# Patient Record
Sex: MALE | Race: WHITE | HISPANIC OR LATINO | Employment: FULL TIME | URBAN - METROPOLITAN AREA
[De-identification: names, ages, dates, MRNs, and addresses within clinical notes are randomized per-mention and may not be internally consistent; named-entity substitution may affect disease eponyms.]

---

## 2018-08-27 ENCOUNTER — HOSPITAL ENCOUNTER (INPATIENT)
Facility: MEDICAL CENTER | Age: 28
LOS: 1 days | DRG: 563 | End: 2018-08-28
Attending: EMERGENCY MEDICINE | Admitting: INTERNAL MEDICINE
Payer: COMMERCIAL

## 2018-08-27 ENCOUNTER — APPOINTMENT (OUTPATIENT)
Dept: RADIOLOGY | Facility: MEDICAL CENTER | Age: 28
DRG: 563 | End: 2018-08-27
Attending: EMERGENCY MEDICINE
Payer: COMMERCIAL

## 2018-08-27 DIAGNOSIS — M65.9 FLEXOR TENOSYNOVITIS OF FINGER: ICD-10-CM

## 2018-08-27 LAB
ALBUMIN SERPL BCP-MCNC: 4.3 G/DL (ref 3.2–4.9)
ALBUMIN/GLOB SERPL: 1.3 G/DL
ALP SERPL-CCNC: 119 U/L (ref 30–99)
ALT SERPL-CCNC: 15 U/L (ref 2–50)
ANION GAP SERPL CALC-SCNC: 9 MMOL/L (ref 0–11.9)
AST SERPL-CCNC: 18 U/L (ref 12–45)
BASOPHILS # BLD AUTO: 0.4 % (ref 0–1.8)
BASOPHILS # BLD: 0.04 K/UL (ref 0–0.12)
BILIRUB SERPL-MCNC: 0.5 MG/DL (ref 0.1–1.5)
BUN SERPL-MCNC: 16 MG/DL (ref 8–22)
CALCIUM SERPL-MCNC: 9.4 MG/DL (ref 8.5–10.5)
CHLORIDE SERPL-SCNC: 105 MMOL/L (ref 96–112)
CO2 SERPL-SCNC: 23 MMOL/L (ref 20–33)
CREAT SERPL-MCNC: 0.79 MG/DL (ref 0.5–1.4)
CRP SERPL HS-MCNC: 1.47 MG/DL (ref 0–0.75)
EOSINOPHIL # BLD AUTO: 0.15 K/UL (ref 0–0.51)
EOSINOPHIL NFR BLD: 1.4 % (ref 0–6.9)
ERYTHROCYTE [DISTWIDTH] IN BLOOD BY AUTOMATED COUNT: 41.4 FL (ref 35.9–50)
ERYTHROCYTE [SEDIMENTATION RATE] IN BLOOD BY WESTERGREN METHOD: 31 MM/HOUR (ref 0–15)
GLOBULIN SER CALC-MCNC: 3.3 G/DL (ref 1.9–3.5)
GLUCOSE SERPL-MCNC: 87 MG/DL (ref 65–99)
HCT VFR BLD AUTO: 40.1 % (ref 42–52)
HGB BLD-MCNC: 12.8 G/DL (ref 14–18)
IMM GRANULOCYTES # BLD AUTO: 0.05 K/UL (ref 0–0.11)
IMM GRANULOCYTES NFR BLD AUTO: 0.5 % (ref 0–0.9)
LYMPHOCYTES # BLD AUTO: 3.19 K/UL (ref 1–4.8)
LYMPHOCYTES NFR BLD: 30.2 % (ref 22–41)
MCH RBC QN AUTO: 26.8 PG (ref 27–33)
MCHC RBC AUTO-ENTMCNC: 31.9 G/DL (ref 33.7–35.3)
MCV RBC AUTO: 84.1 FL (ref 81.4–97.8)
MONOCYTES # BLD AUTO: 0.93 K/UL (ref 0–0.85)
MONOCYTES NFR BLD AUTO: 8.8 % (ref 0–13.4)
NEUTROPHILS # BLD AUTO: 6.22 K/UL (ref 1.82–7.42)
NEUTROPHILS NFR BLD: 58.7 % (ref 44–72)
NRBC # BLD AUTO: 0 K/UL
NRBC BLD-RTO: 0 /100 WBC
PLATELET # BLD AUTO: 191 K/UL (ref 164–446)
PMV BLD AUTO: 12 FL (ref 9–12.9)
POTASSIUM SERPL-SCNC: 3.8 MMOL/L (ref 3.6–5.5)
PROT SERPL-MCNC: 7.6 G/DL (ref 6–8.2)
RBC # BLD AUTO: 4.77 M/UL (ref 4.7–6.1)
SODIUM SERPL-SCNC: 137 MMOL/L (ref 135–145)
WBC # BLD AUTO: 10.6 K/UL (ref 4.8–10.8)

## 2018-08-27 PROCEDURE — 700111 HCHG RX REV CODE 636 W/ 250 OVERRIDE (IP): Performed by: EMERGENCY MEDICINE

## 2018-08-27 PROCEDURE — 86140 C-REACTIVE PROTEIN: CPT

## 2018-08-27 PROCEDURE — 700105 HCHG RX REV CODE 258: Performed by: EMERGENCY MEDICINE

## 2018-08-27 PROCEDURE — 700102 HCHG RX REV CODE 250 W/ 637 OVERRIDE(OP): Performed by: EMERGENCY MEDICINE

## 2018-08-27 PROCEDURE — 96365 THER/PROPH/DIAG IV INF INIT: CPT

## 2018-08-27 PROCEDURE — 80053 COMPREHEN METABOLIC PANEL: CPT

## 2018-08-27 PROCEDURE — 85025 COMPLETE CBC W/AUTO DIFF WBC: CPT

## 2018-08-27 PROCEDURE — 99285 EMERGENCY DEPT VISIT HI MDM: CPT

## 2018-08-27 PROCEDURE — 85652 RBC SED RATE AUTOMATED: CPT

## 2018-08-27 PROCEDURE — A9270 NON-COVERED ITEM OR SERVICE: HCPCS | Performed by: EMERGENCY MEDICINE

## 2018-08-27 PROCEDURE — 73140 X-RAY EXAM OF FINGER(S): CPT | Mod: RT

## 2018-08-27 RX ORDER — HYDROCODONE BITARTRATE AND ACETAMINOPHEN 5; 325 MG/1; MG/1
1 TABLET ORAL ONCE
Status: COMPLETED | OUTPATIENT
Start: 2018-08-27 | End: 2018-08-27

## 2018-08-27 RX ADMIN — PIPERACILLIN AND TAZOBACTAM 4.5 G: 4; .5 INJECTION, POWDER, LYOPHILIZED, FOR SOLUTION INTRAVENOUS; PARENTERAL at 22:14

## 2018-08-27 RX ADMIN — HYDROCODONE BITARTRATE AND ACETAMINOPHEN 1 TABLET: 5; 325 TABLET ORAL at 21:08

## 2018-08-27 ASSESSMENT — PAIN SCALES - GENERAL: PAINLEVEL_OUTOF10: 10

## 2018-08-27 ASSESSMENT — LIFESTYLE VARIABLES: DO YOU DRINK ALCOHOL: YES

## 2018-08-28 ENCOUNTER — HOSPITAL ENCOUNTER (INPATIENT)
Facility: MEDICAL CENTER | Age: 28
LOS: 4 days | DRG: 514 | End: 2018-09-02
Attending: EMERGENCY MEDICINE | Admitting: INTERNAL MEDICINE
Payer: COMMERCIAL

## 2018-08-28 VITALS
RESPIRATION RATE: 16 BRPM | TEMPERATURE: 98.4 F | SYSTOLIC BLOOD PRESSURE: 130 MMHG | HEIGHT: 68 IN | OXYGEN SATURATION: 99 % | HEART RATE: 82 BPM | BODY MASS INDEX: 25.66 KG/M2 | WEIGHT: 169.31 LBS | DIASTOLIC BLOOD PRESSURE: 73 MMHG

## 2018-08-28 DIAGNOSIS — M65.9 FLEXOR TENOSYNOVITIS OF FINGER: ICD-10-CM

## 2018-08-28 PROBLEM — Z72.0 TOBACCO ABUSE: Status: ACTIVE | Noted: 2018-08-28

## 2018-08-28 PROCEDURE — 99238 HOSP IP/OBS DSCHRG MGMT 30/<: CPT | Performed by: HOSPITALIST

## 2018-08-28 PROCEDURE — 96375 TX/PRO/DX INJ NEW DRUG ADDON: CPT

## 2018-08-28 PROCEDURE — 99222 1ST HOSP IP/OBS MODERATE 55: CPT | Mod: 25 | Performed by: INTERNAL MEDICINE

## 2018-08-28 PROCEDURE — A9270 NON-COVERED ITEM OR SERVICE: HCPCS | Performed by: INTERNAL MEDICINE

## 2018-08-28 PROCEDURE — 99406 BEHAV CHNG SMOKING 3-10 MIN: CPT | Mod: 25 | Performed by: INTERNAL MEDICINE

## 2018-08-28 PROCEDURE — 96367 TX/PROPH/DG ADDL SEQ IV INF: CPT

## 2018-08-28 PROCEDURE — 770006 HCHG ROOM/CARE - MED/SURG/GYN SEMI*

## 2018-08-28 PROCEDURE — 700105 HCHG RX REV CODE 258: Performed by: INTERNAL MEDICINE

## 2018-08-28 PROCEDURE — 700102 HCHG RX REV CODE 250 W/ 637 OVERRIDE(OP): Performed by: INTERNAL MEDICINE

## 2018-08-28 PROCEDURE — 99285 EMERGENCY DEPT VISIT HI MDM: CPT

## 2018-08-28 PROCEDURE — 700111 HCHG RX REV CODE 636 W/ 250 OVERRIDE (IP): Performed by: INTERNAL MEDICINE

## 2018-08-28 RX ORDER — ONDANSETRON 2 MG/ML
4 INJECTION INTRAMUSCULAR; INTRAVENOUS EVERY 4 HOURS PRN
Status: DISCONTINUED | OUTPATIENT
Start: 2018-08-28 | End: 2018-08-28 | Stop reason: HOSPADM

## 2018-08-28 RX ORDER — ONDANSETRON 2 MG/ML
4 INJECTION INTRAMUSCULAR; INTRAVENOUS ONCE
Status: COMPLETED | OUTPATIENT
Start: 2018-08-28 | End: 2018-08-29

## 2018-08-28 RX ORDER — KETOROLAC TROMETHAMINE 30 MG/ML
30 INJECTION, SOLUTION INTRAMUSCULAR; INTRAVENOUS ONCE
Status: COMPLETED | OUTPATIENT
Start: 2018-08-28 | End: 2018-08-28

## 2018-08-28 RX ORDER — BISACODYL 10 MG
10 SUPPOSITORY, RECTAL RECTAL
Status: DISCONTINUED | OUTPATIENT
Start: 2018-08-28 | End: 2018-08-28 | Stop reason: HOSPADM

## 2018-08-28 RX ORDER — MORPHINE SULFATE 4 MG/ML
4 INJECTION, SOLUTION INTRAMUSCULAR; INTRAVENOUS ONCE
Status: COMPLETED | OUTPATIENT
Start: 2018-08-28 | End: 2018-08-29

## 2018-08-28 RX ORDER — PROMETHAZINE HYDROCHLORIDE 25 MG/1
12.5-25 TABLET ORAL EVERY 4 HOURS PRN
Status: DISCONTINUED | OUTPATIENT
Start: 2018-08-28 | End: 2018-08-28 | Stop reason: HOSPADM

## 2018-08-28 RX ORDER — OXYCODONE HYDROCHLORIDE 5 MG/1
5 TABLET ORAL
Status: DISCONTINUED | OUTPATIENT
Start: 2018-08-28 | End: 2018-08-28 | Stop reason: HOSPADM

## 2018-08-28 RX ORDER — SODIUM CHLORIDE 9 MG/ML
1000 INJECTION, SOLUTION INTRAVENOUS
Status: DISCONTINUED | OUTPATIENT
Start: 2018-08-28 | End: 2018-08-28 | Stop reason: HOSPADM

## 2018-08-28 RX ORDER — ACETAMINOPHEN 325 MG/1
650 TABLET ORAL EVERY 6 HOURS PRN
Status: DISCONTINUED | OUTPATIENT
Start: 2018-08-28 | End: 2018-08-28 | Stop reason: HOSPADM

## 2018-08-28 RX ORDER — IBUPROFEN 200 MG
400 TABLET ORAL EVERY 8 HOURS PRN
COMMUNITY

## 2018-08-28 RX ORDER — SODIUM CHLORIDE 9 MG/ML
30 INJECTION, SOLUTION INTRAVENOUS
Status: COMPLETED | OUTPATIENT
Start: 2018-08-28 | End: 2018-08-28

## 2018-08-28 RX ORDER — KETOROLAC TROMETHAMINE 30 MG/ML
30 INJECTION, SOLUTION INTRAMUSCULAR; INTRAVENOUS EVERY 6 HOURS PRN
Status: DISCONTINUED | OUTPATIENT
Start: 2018-08-28 | End: 2018-08-28 | Stop reason: HOSPADM

## 2018-08-28 RX ORDER — PROMETHAZINE HYDROCHLORIDE 12.5 MG/1
12.5-25 SUPPOSITORY RECTAL EVERY 4 HOURS PRN
Status: DISCONTINUED | OUTPATIENT
Start: 2018-08-28 | End: 2018-08-28 | Stop reason: HOSPADM

## 2018-08-28 RX ORDER — POLYETHYLENE GLYCOL 3350 17 G/17G
1 POWDER, FOR SOLUTION ORAL
Status: DISCONTINUED | OUTPATIENT
Start: 2018-08-28 | End: 2018-08-28 | Stop reason: HOSPADM

## 2018-08-28 RX ORDER — OXYCODONE HYDROCHLORIDE 10 MG/1
10 TABLET ORAL
Status: DISCONTINUED | OUTPATIENT
Start: 2018-08-28 | End: 2018-08-28 | Stop reason: HOSPADM

## 2018-08-28 RX ORDER — ONDANSETRON 4 MG/1
4 TABLET, ORALLY DISINTEGRATING ORAL EVERY 4 HOURS PRN
Status: DISCONTINUED | OUTPATIENT
Start: 2018-08-28 | End: 2018-08-28 | Stop reason: HOSPADM

## 2018-08-28 RX ORDER — SODIUM CHLORIDE 9 MG/ML
INJECTION, SOLUTION INTRAVENOUS CONTINUOUS
Status: DISCONTINUED | OUTPATIENT
Start: 2018-08-28 | End: 2018-08-28 | Stop reason: HOSPADM

## 2018-08-28 RX ORDER — AMOXICILLIN 250 MG
2 CAPSULE ORAL 2 TIMES DAILY
Status: DISCONTINUED | OUTPATIENT
Start: 2018-08-28 | End: 2018-08-28 | Stop reason: HOSPADM

## 2018-08-28 RX ORDER — MORPHINE SULFATE 4 MG/ML
4 INJECTION, SOLUTION INTRAMUSCULAR; INTRAVENOUS
Status: DISCONTINUED | OUTPATIENT
Start: 2018-08-28 | End: 2018-08-28 | Stop reason: HOSPADM

## 2018-08-28 RX ADMIN — SODIUM CHLORIDE 2301 ML: 9 INJECTION, SOLUTION INTRAVENOUS at 03:22

## 2018-08-28 RX ADMIN — AMPICILLIN AND SULBACTAM 3 G: 2; 1 INJECTION, POWDER, FOR SOLUTION INTRAVENOUS at 11:48

## 2018-08-28 RX ADMIN — SODIUM CHLORIDE: 9 INJECTION, SOLUTION INTRAVENOUS at 17:53

## 2018-08-28 RX ADMIN — KETOROLAC TROMETHAMINE 30 MG: 30 INJECTION, SOLUTION INTRAMUSCULAR at 02:01

## 2018-08-28 RX ADMIN — ACETAMINOPHEN 650 MG: 325 TABLET, FILM COATED ORAL at 18:00

## 2018-08-28 RX ADMIN — OXYCODONE HYDROCHLORIDE 10 MG: 10 TABLET ORAL at 03:21

## 2018-08-28 RX ADMIN — AMPICILLIN AND SULBACTAM 3 G: 2; 1 INJECTION, POWDER, FOR SOLUTION INTRAVENOUS at 17:53

## 2018-08-28 RX ADMIN — AMPICILLIN AND SULBACTAM 3 G: 2; 1 INJECTION, POWDER, FOR SOLUTION INTRAVENOUS at 05:41

## 2018-08-28 RX ADMIN — KETOROLAC TROMETHAMINE 30 MG: 30 INJECTION, SOLUTION INTRAMUSCULAR at 11:50

## 2018-08-28 RX ADMIN — ACETAMINOPHEN 650 MG: 325 TABLET, FILM COATED ORAL at 08:14

## 2018-08-28 RX ADMIN — SODIUM CHLORIDE: 9 INJECTION, SOLUTION INTRAVENOUS at 05:40

## 2018-08-28 RX ADMIN — MORPHINE SULFATE 4 MG: 4 INJECTION INTRAVENOUS at 05:41

## 2018-08-28 RX ADMIN — AMPICILLIN AND SULBACTAM 3 G: 2; 1 INJECTION, POWDER, FOR SOLUTION INTRAVENOUS at 02:01

## 2018-08-28 RX ADMIN — SODIUM CHLORIDE 1000 ML: 9 INJECTION, SOLUTION INTRAVENOUS at 02:01

## 2018-08-28 RX ADMIN — DOCUSATE SODIUM -SENNOSIDES 2 TABLET: 50; 8.6 TABLET, COATED ORAL at 17:53

## 2018-08-28 RX ADMIN — DOCUSATE SODIUM -SENNOSIDES 2 TABLET: 50; 8.6 TABLET, COATED ORAL at 03:21

## 2018-08-28 ASSESSMENT — LIFESTYLE VARIABLES
ALCOHOL_USE: YES
EVER_SMOKED: YES
AVERAGE NUMBER OF DAYS PER WEEK YOU HAVE A DRINK CONTAINING ALCOHOL: 1
HOW MANY TIMES IN THE PAST YEAR HAVE YOU HAD 5 OR MORE DRINKS IN A DAY: 0
EVER_SMOKED: YES
TOTAL SCORE: 0
ON A TYPICAL DAY WHEN YOU DRINK ALCOHOL HOW MANY DRINKS DO YOU HAVE: 2
EVER HAD A DRINK FIRST THING IN THE MORNING TO STEADY YOUR NERVES TO GET RID OF A HANGOVER: NO
EVER FELT BAD OR GUILTY ABOUT YOUR DRINKING: NO
HAVE YOU EVER FELT YOU SHOULD CUT DOWN ON YOUR DRINKING: NO
HAVE PEOPLE ANNOYED YOU BY CRITICIZING YOUR DRINKING: NO
TOTAL SCORE: 0
TOTAL SCORE: 0
CONSUMPTION TOTAL: NEGATIVE

## 2018-08-28 ASSESSMENT — ENCOUNTER SYMPTOMS
BLURRED VISION: 0
WHEEZING: 0
NECK PAIN: 0
SHORTNESS OF BREATH: 0
BACK PAIN: 0
HEADACHES: 0
BLOOD IN STOOL: 0
FLANK PAIN: 0
SPUTUM PRODUCTION: 0
BRUISES/BLEEDS EASILY: 0
FOCAL WEAKNESS: 0
MYALGIAS: 0
PALPITATIONS: 0
ABDOMINAL PAIN: 0
SORE THROAT: 0
FEVER: 0
CHILLS: 0
DIARRHEA: 0
DIZZINESS: 0
VOMITING: 0
COUGH: 0
SEIZURES: 0
DIAPHORESIS: 0
NAUSEA: 0

## 2018-08-28 ASSESSMENT — COGNITIVE AND FUNCTIONAL STATUS - GENERAL
DAILY ACTIVITIY SCORE: 24
SUGGESTED CMS G CODE MODIFIER DAILY ACTIVITY: CH
MOBILITY SCORE: 24
SUGGESTED CMS G CODE MODIFIER MOBILITY: CH

## 2018-08-28 ASSESSMENT — COPD QUESTIONNAIRES
DURING THE PAST 4 WEEKS HOW MUCH DID YOU FEEL SHORT OF BREATH: NONE/LITTLE OF THE TIME
DO YOU EVER COUGH UP ANY MUCUS OR PHLEGM?: NO/ONLY WITH OCCASIONAL COLDS OR INFECTIONS
COPD SCREENING SCORE: 2
DO YOU EVER COUGH UP ANY MUCUS OR PHLEGM?: NO/ONLY WITH OCCASIONAL COLDS OR INFECTIONS
IN THE PAST 12 MONTHS DO YOU DO LESS THAN YOU USED TO BECAUSE OF YOUR BREATHING PROBLEMS: DISAGREE/UNSURE
HAVE YOU SMOKED AT LEAST 100 CIGARETTES IN YOUR ENTIRE LIFE: YES
HAVE YOU SMOKED AT LEAST 100 CIGARETTES IN YOUR ENTIRE LIFE: YES
DURING THE PAST 4 WEEKS HOW MUCH DID YOU FEEL SHORT OF BREATH: NONE/LITTLE OF THE TIME
COPD SCREENING SCORE: 2

## 2018-08-28 ASSESSMENT — PATIENT HEALTH QUESTIONNAIRE - PHQ9
SUM OF ALL RESPONSES TO PHQ9 QUESTIONS 1 AND 2: 0
1. LITTLE INTEREST OR PLEASURE IN DOING THINGS: NOT AT ALL
2. FEELING DOWN, DEPRESSED, IRRITABLE, OR HOPELESS: NOT AT ALL

## 2018-08-28 ASSESSMENT — PAIN SCALES - GENERAL
PAINLEVEL_OUTOF10: 7
PAINLEVEL_OUTOF10: 0
PAINLEVEL_OUTOF10: 5
PAINLEVEL_OUTOF10: 9
PAINLEVEL_OUTOF10: 7

## 2018-08-28 NOTE — CARE PLAN
Problem: Communication  Goal: The ability to communicate needs accurately and effectively will improve  Outcome: PROGRESSING AS EXPECTED  Plan of care discussed with patient upon admission to unit, oriented to unit, room, and call light    Problem: Infection  Goal: Will remain free from infection  Outcome: PROGRESSING AS EXPECTED  Monitoring lab data and vital signs.     Problem: Pain Management  Goal: Pain level will decrease to patient's comfort goal  Outcome: PROGRESSING AS EXPECTED  Discussed pain medication schedule and alternative interventions with patient

## 2018-08-28 NOTE — PROGRESS NOTES
Bedside report received.  Assessment complete.  A&O x 4. Patient calls appropriately.  Patient mobilizes up self.  Patient has 4/10 pain. Medicated per mar  Denies N&V. NPO  + void, + flatus  Patient denies SOB.  Review plan with of care with patient. Call light and personal belongings with in reach. Hourly rounding in place. All needs met at this time.

## 2018-08-28 NOTE — PROGRESS NOTES
Pt received to unit at 0240 from ED  Pt is A&O 4  Pain 7/10, ice pack given  denies nausea  NPO diet  Skin intact. Redness and swelling to right middle finger.   Voiding without difficulty  Up independently, pt instructed to call for assistance getting out of bed   Reviewed plan of care with patient, bed in lowest position and locked, pt resting comfortably now, call light within reach, all needs met at this time

## 2018-08-28 NOTE — PROGRESS NOTES
Patient seen and examined at bedside. Admitted earlier this morning for flexor tenosynovitis. Says arm swelling has not progressed. Evaluated by ortho with plan for IV therapy for 24 hours; if no improvement by a.m. Plan for surgical intervention.  He is hemodynamically stable.

## 2018-08-28 NOTE — ED PROVIDER NOTES
"ED Provider Note  Chief Complaint:   Right hand pain    HPI:  Ramone Connors is a 28 y.o. male who presents with chief complaint of right hand pain. One week ago, he was helping a friend move when he dropped a heavy piece of furniture on the right long finger.  Initially states the pain was severe, the finger became very swollen.  He treated this with ibuprofen and ice with some improvement, however over the last 24-48 hours he developed progressively worsening swelling along the palmar aspect of the digit.  Since the time of the injury he was not able to range the finger, this is unchanged today.  He denies any pain to the palmar aspect of the hand, denies involvement of any other digits.  He did not sustain any lacerations or abrasions to the hand at the time of the injury.  He is right-hand dominant.    He has no past medical history of impaired immunity, no history of diabetes, no abnormal bleeding or bruising.    Review of Systems:  See HPI for pertinent positives and negatives.  Review of systems otherwise negative.    Past Medical History:       Social History:  Social History     Social History Main Topics   • Smoking status: Current Every Day Smoker   • Smokeless tobacco: Never Used   • Alcohol use Yes   • Drug use: No   • Sexual activity: Not on file       Surgical History:  patient denies any surgical history    Current Medications:  Home Medications    **Home medications have not yet been reviewed for this encounter**         Allergies:  No Known Allergies    Physical Exam:  Vital Signs: /90   Pulse 86   Temp 36.5 °C (97.7 °F)   Resp 20   Ht 1.727 m (5' 8\")   Wt 76.7 kg (169 lb 1.5 oz)   SpO2 98%   BMI 25.71 kg/m²   Constitutional: Alert, no acute distress  HENT: Atraumatic, normocephalic  Cardiovascular: Extremities are warm and well perfused, right hand with 2+ radial and ulnar pulses  Skin: Abnormalities as documented muscular skeletal exam  Musculoskeletal: Significant " swelling to the flexor aspect of the right long finger, finger is held in gentle flexion, minimal redness overlying the ulnar aspect of the digit, entire digit is moderately tender to palpation.  There is normal capillary refill time.  Sensation intact along the ulnar and radial aspect of the digit.  Flexion and extension is limited by pain.  Neurologic: Alert, oriented, normal speech, normal motor function  Psychiatric: Normal and appropriate mood and affect    Medical records reviewed for continuity of care.  No recent emergency department visits for similar symptoms.    Radiology:  DX-FINGER(S) 2+ RIGHT   Final Result      Soft tissue swelling. No evidence of fracture.         ED Medications Administered:  Medications   HYDROcodone-acetaminophen (NORCO) 5-325 MG per tablet 1 Tab (1 Tab Oral Given 8/27/18 2108)   piperacillin-tazobactam (ZOSYN) 4.5 g in  mL IVPB (0 g Intravenous Stopped 8/27/18 2244)     MDM:  History and physical exam as documented above.  Pain is treated with Norco on arrival to the emergency department.  Plain films ordered to evaluate for evidence of fracture.  These are negative for bony abnormality.    On laboratory evaluation CMP is within normal limits.  CRP elevated to 1.47, ESR pending at this time.  He does have a normal white blood count, no bands resulted at this time.    Clinically, presentation is concerning for flexor tenosynovitis given redness around the digit, swelling and gentle flexion is position of comfort.  He was treated with Zosyn in the emergency department.  Call placed to Dr. Maldonado, on-call for hand surgery today.  As his symptoms have been very slowly progressing over the past week and a half, I do believe treatment with antibiotics overnight with early orthopedic evaluation this morning is reasonable.  He agrees, kindly agrees to evaluate the patient first thing in the morning for operative intervention tomorrow if necessary.    Case discussed with   Chloe, who kindly agrees to admit the patient.    Disposition:  Admit to hospitalist in guarded condition    Final Impression:  1. Flexor tenosynovitis of finger        Electronically signed by: Nusrat Wade, 8/28/2018 12:15 AM

## 2018-08-28 NOTE — CARE PLAN
Problem: Communication  Goal: The ability to communicate needs accurately and effectively will improve  Outcome: PROGRESSING AS EXPECTED  Patient encouraged to voice any concerns or questions he may have. Will keep him updated on plan of care.    Problem: Pain Management  Goal: Pain level will decrease to patient's comfort goal  Outcome: PROGRESSING AS EXPECTED  Pt educated on pain medications and times available if needed.

## 2018-08-28 NOTE — H&P
Hospital Medicine History & Physical Note    Date of Service  8/28/2018    Primary Care Physician  Pcp Pt States None    Consultants  Eliana O Dr. Maldonado    Code Status  Full code    Chief Complaint  Right hand pain    History of Presenting Illness  28 y.o. male who presented 8/27/2018 with right hand pain for the past 1 week.  Patient states that he was helping a friend move when he dropped some heavy furniture on his right middle finger.  Patient had excruciating pain that time and treated it with ibuprofen and ice packs with minimal improvement.  She then noticed progressively worsening swelling and pain of his right middle finger.  Pain is worse with moving his finger.  He denies any fevers, chills, chest pain, shortness of breath.    X-ray right hand interpreted by me reveals soft tissue swelling of the third digit and no obvious fracture.    Review of Systems  Review of Systems   Constitutional: Negative for chills, diaphoresis and fever.   HENT: Negative for hearing loss and sore throat.    Eyes: Negative for blurred vision.   Respiratory: Negative for cough, sputum production, shortness of breath and wheezing.    Cardiovascular: Negative for chest pain, palpitations and leg swelling.   Gastrointestinal: Negative for abdominal pain, blood in stool, diarrhea, nausea and vomiting.   Genitourinary: Negative for dysuria, flank pain and urgency.   Musculoskeletal: Positive for joint pain. Negative for back pain, myalgias and neck pain.   Skin: Negative for rash.   Neurological: Negative for dizziness, focal weakness, seizures and headaches.   Endo/Heme/Allergies: Does not bruise/bleed easily.   Psychiatric/Behavioral: Negative for suicidal ideas.   All other systems reviewed and are negative.      Past Medical History  No pertinent medical history    Surgical History  No pertinent surgical history    Family History  History reviewed.  No pertinent family history    Social History   reports that he has been  smoking.  He has never used smokeless tobacco. He reports that he drinks alcohol. He reports that he does not use drugs.    Allergies  No Known Allergies    Medications  None       Physical Exam  Blood Pressure: 135/90   Temperature: 36.5 °C (97.7 °F)   Pulse: 86   Respiration: 20   Pulse Oximetry: 98 %     Physical Exam   Constitutional: He is oriented to person, place, and time. He appears well-developed and well-nourished. No distress.   HENT:   Head: Normocephalic and atraumatic.   Mouth/Throat: Oropharynx is clear and moist.   Eyes: Pupils are equal, round, and reactive to light. Conjunctivae are normal. No scleral icterus.   Neck: Normal range of motion. Neck supple.   Cardiovascular: Normal rate, regular rhythm and normal heart sounds.    Pulmonary/Chest: Effort normal and breath sounds normal. No respiratory distress. He has no wheezes. He has no rales.   Abdominal: Soft. Bowel sounds are normal. He exhibits no distension. There is no tenderness. There is no rebound.   Musculoskeletal: He exhibits tenderness.   Significant swelling of right third digit, limited range of motion due to swelling and pain.  Tenderness to palpation   Lymphadenopathy:     He has no cervical adenopathy.   Neurological: He is alert and oriented to person, place, and time. No cranial nerve deficit. Coordination normal.   Skin: Skin is warm. No rash noted.   Psychiatric: He has a normal mood and affect. His behavior is normal.   Nursing note and vitals reviewed.      Laboratory:  Recent Labs      08/27/18 2215   WBC  10.6   RBC  4.77   HEMOGLOBIN  12.8*   HEMATOCRIT  40.1*   MCV  84.1   MCH  26.8*   MCHC  31.9*   RDW  41.4   PLATELETCT  191   MPV  12.0     Recent Labs      08/27/18 2215   SODIUM  137   POTASSIUM  3.8   CHLORIDE  105   CO2  23   GLUCOSE  87   BUN  16   CREATININE  0.79   CALCIUM  9.4     Recent Labs      08/27/18 2215   ALTSGPT  15   ASTSGOT  18   ALKPHOSPHAT  119*   TBILIRUBIN  0.5   GLUCOSE  87                  No results found for: TROPONINI    Urinalysis:    No results found     Imaging:  DX-FINGER(S) 2+ RIGHT   Final Result      Soft tissue swelling. No evidence of fracture.            Assessment/Plan:  I anticipate this patient will require at least two midnights for appropriate medical management, necessitating inpatient admission.    Flexor tenosynovitis of finger- (present on admission)   Assessment & Plan    Patient has been started on IV Unasyn  Follow blood cultures  Surgery has been consulted  Pain control with IV Toradol, oxycodone and IV morphine, monitor respiratory status closely        Tobacco abuse   Assessment & Plan    Tobacco cessation education provided for more than 3 minutes, discussed options of nicotine patch, medical treatment with wellbutrin and chantix. Discussed the risks of smoking including increased risk of heart disease, stroke, cancer and COPD. Discussed the benefits of quitting smoking. Nicotine replacement protocol will be provided to the patient.              VTE prophylaxis: SCD

## 2018-08-28 NOTE — PROGRESS NOTES
Right long finger flexor tenosynovitis  IV ABX for 24 hrs  If no improvement may need surgical intervention

## 2018-08-28 NOTE — PROGRESS NOTES
2 RN skin assessment performed.   Redness and swelling to right middle finger  No other wounds noted.

## 2018-08-28 NOTE — DISCHARGE INSTRUCTIONS
Discharge Instructions    Discharged to home by car with relative. Discharged via wheelchair, hospital escort: Yes.  Special equipment needed: Not Applicable    Be sure to schedule a follow-up appointment with your primary care doctor or any specialists as instructed.     Discharge Plan:   Diet Plan: Discussed  Activity Level: Discussed  Smoking Cessation Offered: Patient Counseled  Confirmed Follow up Appointment: Patient to Call and Schedule Appointment  Confirmed Symptoms Management: Discussed  Medication Reconciliation Updated: Yes  Influenza Vaccine Indication: Indicated: Not available from distributor/    I understand that a diet low in cholesterol, fat, and sodium is recommended for good health. Unless I have been given specific instructions below for another diet, I accept this instruction as my diet prescription.   Other diet: Regular    Special Instructions: None    · Is patient discharged on Warfarin / Coumadin?   No     Depression / Suicide Risk    As you are discharged from this [facility] facility, it is important to learn how to keep safe from harming yourself.    Recognize the warning signs:  · Abrupt changes in personality, positive or negative- including increase in energy   · Giving away possessions  · Change in eating patterns- significant weight changes-  positive or negative  · Change in sleeping patterns- unable to sleep or sleeping all the time   · Unwillingness or inability to communicate  · Depression  · Unusual sadness, discouragement and loneliness  · Talk of wanting to die  · Neglect of personal appearance   · Rebelliousness- reckless behavior  · Withdrawal from people/activities they love  · Confusion- inability to concentrate     If you or a loved one observes any of these behaviors or has concerns about self-harm, here's what you can do:  · Talk about it- your feelings and reasons for harming yourself  · Remove any means that you might use to hurt yourself (examples:  pills, rope, extension cords, firearm)  · Get professional help from the community (Mental Health, Substance Abuse, psychological counseling)  · Do not be alone:Call your Safe Contact- someone whom you trust who will be there for you.  · Call your local CRISIS HOTLINE 364-0161 or 947-250-7757  · Call your local Children's Mobile Crisis Response Team [place] (819) 134-0809 or www.[employer]  · Call the toll free National Suicide Prevention Hotlines   · National Suicide Prevention Lifeline 800-030-RVDZ (5572)  · [employer] Hope Line Network 800-SUICIDE (325-8485)      Antibiotic Medicine  Introduction  Antibiotic medicines are used to treat infections caused by bacteria. They work by hurting or killing the germs that are making you sick.  How will my medicine be picked?  There are many kinds of antibiotic medicines. To help your doctor pick one, tell your doctor if:  · You have any allergies.  · You are pregnant or plan to get pregnant.  · You are breastfeeding.  · You are taking any medicines. These include over-the-counter medicines, prescription medicines, and herbal remedies.  · You have a medical condition or problem.  If you have questions about why your medicine was picked, ask.  For how long should I take my medicine?  Take your medicine for as long as your doctor tells you to. Do not stop taking it when you feel better. If you stop taking it too soon:  · You may start to feel sick again.  · Your infection may get harder to treat.  · New problems may develop.  What if I miss a dose?  Try not to miss any doses of antibiotic medicine. If you miss a dose:  · Take the dose as soon as you can.  · If you are taking 2 doses a day, take the next dose in 5 to 6 hours.  · If you are taking 3 or more doses a day, take the next dose in 2 to 4 hours. Then go back to the normal schedule.  If you cannot take a missed dose, take the next dose on time. Then take the missed dose after you have taken all the doses as  told by your doctor, as if you had one more dose left.  Does this medicine affect birth control?  Birth control pills may not work while you are on antibiotic medicines. If you are taking birth control pills, keep taking them as usual. Use a second form of birth control, such as a condom. Keep using the second form of birth control until you are finished with your current 1 month cycle of birth control pills.  Get help if:  · You get worse.  · You do not feel better a few days after starting the medicine.  · You throw up (vomit).  · There are white patches in your mouth.  · You have new joint pain after starting the medicine.  · You have new muscle aches after starting the medicine.  · You had a fever before starting the medicine, and it comes back.  · You have any symptoms of an allergic reaction, such as an itchy rash. If this happens, stop taking the medicine.  Get help right away if:  · Your pee (urine) turns dark or becomes blood-colored.  · Your skin turns yellow.  · You bruise or bleed easily.  · You have very bad watery poop (diarrhea) and cramps in your belly (abdomen).  · You have a very bad headache.  · You have signs of a very bad allergic reaction, such as:  ¨ Trouble breathing.  ¨ Wheezing.  ¨ Swelling of the lips, tongue, or face.  ¨ Fainting.  ¨ Blisters on the skin or in the mouth.  If you have signs of a very bad allergic reaction, stop taking the antibiotic medicine right away.  This information is not intended to replace advice given to you by your health care provider. Make sure you discuss any questions you have with your health care provider.  Document Released: 09/26/2009 Document Revised: 08/15/2017 Document Reviewed: 05/04/2016  © 2017 [employer]vier

## 2018-08-28 NOTE — ED TRIAGE NOTES
"Chief Complaint   Patient presents with   • Digit Pain     Pt had a dresser drop on his R middle finger last week, reports continues to become more swollen and is pulsating now causing more pain.      /90   Pulse 86   Temp 36.5 °C (97.7 °F)   Resp 20   Ht 1.727 m (5' 8\")   Wt 76.7 kg (169 lb 1.5 oz)   SpO2 98%   BMI 25.71 kg/m²     Pt ambulatory to triage for above, steady on feet. Visible swelling and bruising to finger. Pt returned to [facility], educated on triage process, instructed to notify staff of worsening symptoms/concerns.   "

## 2018-08-28 NOTE — ASSESSMENT & PLAN NOTE
Tobacco cessation education provided for more than 3 minutes, discussed options of nicotine patch, medical treatment with wellbutrin and chantix. Discussed the risks of smoking including increased risk of heart disease, stroke, cancer and COPD. Discussed the benefits of quitting smoking. Nicotine replacement protocol will be provided to the patient.

## 2018-08-28 NOTE — ASSESSMENT & PLAN NOTE
Patient has been started on IV Unasyn  Follow blood cultures  Surgery has been consulted  Pain control with IV Toradol, oxycodone and IV morphine, monitor respiratory status closely

## 2018-08-29 PROBLEM — M65.10 INFECTIOUS TENOSYNOVITIS: Status: ACTIVE | Noted: 2018-08-29

## 2018-08-29 PROBLEM — D72.829 LEUKOCYTOSIS: Status: ACTIVE | Noted: 2018-08-29

## 2018-08-29 LAB
ALBUMIN SERPL BCP-MCNC: 4.2 G/DL (ref 3.2–4.9)
ALBUMIN/GLOB SERPL: 1.3 G/DL
ALP SERPL-CCNC: 130 U/L (ref 30–99)
ALT SERPL-CCNC: 14 U/L (ref 2–50)
ANION GAP SERPL CALC-SCNC: 10 MMOL/L (ref 0–11.9)
APTT PPP: 30.1 SEC (ref 24.7–36)
AST SERPL-CCNC: 15 U/L (ref 12–45)
BASOPHILS # BLD AUTO: 0.3 % (ref 0–1.8)
BASOPHILS # BLD: 0.05 K/UL (ref 0–0.12)
BILIRUB SERPL-MCNC: 0.7 MG/DL (ref 0.1–1.5)
BUN SERPL-MCNC: 10 MG/DL (ref 8–22)
CALCIUM SERPL-MCNC: 9.4 MG/DL (ref 8.5–10.5)
CHLORIDE SERPL-SCNC: 108 MMOL/L (ref 96–112)
CO2 SERPL-SCNC: 22 MMOL/L (ref 20–33)
CREAT SERPL-MCNC: 0.69 MG/DL (ref 0.5–1.4)
EOSINOPHIL # BLD AUTO: 0.08 K/UL (ref 0–0.51)
EOSINOPHIL NFR BLD: 0.5 % (ref 0–6.9)
ERYTHROCYTE [DISTWIDTH] IN BLOOD BY AUTOMATED COUNT: 39.1 FL (ref 35.9–50)
GLOBULIN SER CALC-MCNC: 3.3 G/DL (ref 1.9–3.5)
GLUCOSE SERPL-MCNC: 96 MG/DL (ref 65–99)
HCT VFR BLD AUTO: 39.4 % (ref 42–52)
HGB BLD-MCNC: 12.9 G/DL (ref 14–18)
IMM GRANULOCYTES # BLD AUTO: 0.07 K/UL (ref 0–0.11)
IMM GRANULOCYTES NFR BLD AUTO: 0.4 % (ref 0–0.9)
INR PPP: 1.24 (ref 0.87–1.13)
LACTATE BLD-SCNC: 0.9 MMOL/L (ref 0.5–2)
LACTATE BLD-SCNC: 1 MMOL/L (ref 0.5–2)
LACTATE BLD-SCNC: 1 MMOL/L (ref 0.5–2)
LYMPHOCYTES # BLD AUTO: 3.55 K/UL (ref 1–4.8)
LYMPHOCYTES NFR BLD: 20.3 % (ref 22–41)
MCH RBC QN AUTO: 27.1 PG (ref 27–33)
MCHC RBC AUTO-ENTMCNC: 32.7 G/DL (ref 33.7–35.3)
MCV RBC AUTO: 82.8 FL (ref 81.4–97.8)
MONOCYTES # BLD AUTO: 1.49 K/UL (ref 0–0.85)
MONOCYTES NFR BLD AUTO: 8.5 % (ref 0–13.4)
NEUTROPHILS # BLD AUTO: 12.21 K/UL (ref 1.82–7.42)
NEUTROPHILS NFR BLD: 70 % (ref 44–72)
NRBC # BLD AUTO: 0 K/UL
NRBC BLD-RTO: 0 /100 WBC
PLATELET # BLD AUTO: 190 K/UL (ref 164–446)
PMV BLD AUTO: 11.9 FL (ref 9–12.9)
POTASSIUM SERPL-SCNC: 3.8 MMOL/L (ref 3.6–5.5)
PROT SERPL-MCNC: 7.5 G/DL (ref 6–8.2)
PROTHROMBIN TIME: 15.3 SEC (ref 12–14.6)
RBC # BLD AUTO: 4.76 M/UL (ref 4.7–6.1)
SODIUM SERPL-SCNC: 140 MMOL/L (ref 135–145)
WBC # BLD AUTO: 17.5 K/UL (ref 4.8–10.8)

## 2018-08-29 PROCEDURE — A9270 NON-COVERED ITEM OR SERVICE: HCPCS

## 2018-08-29 PROCEDURE — 160027 HCHG SURGERY MINUTES - 1ST 30 MINS LEVEL 2: Performed by: ORTHOPAEDIC SURGERY

## 2018-08-29 PROCEDURE — 96375 TX/PRO/DX INJ NEW DRUG ADDON: CPT

## 2018-08-29 PROCEDURE — 160048 HCHG OR STATISTICAL LEVEL 1-5: Performed by: ORTHOPAEDIC SURGERY

## 2018-08-29 PROCEDURE — A6222 GAUZE <=16 IN NO W/SAL W/O B: HCPCS | Performed by: ORTHOPAEDIC SURGERY

## 2018-08-29 PROCEDURE — 96367 TX/PROPH/DG ADDL SEQ IV INF: CPT

## 2018-08-29 PROCEDURE — 87077 CULTURE AEROBIC IDENTIFY: CPT

## 2018-08-29 PROCEDURE — 700105 HCHG RX REV CODE 258: Performed by: EMERGENCY MEDICINE

## 2018-08-29 PROCEDURE — 160036 HCHG PACU - EA ADDL 30 MINS PHASE I: Performed by: ORTHOPAEDIC SURGERY

## 2018-08-29 PROCEDURE — 96365 THER/PROPH/DIAG IV INF INIT: CPT

## 2018-08-29 PROCEDURE — 700101 HCHG RX REV CODE 250: Performed by: INTERNAL MEDICINE

## 2018-08-29 PROCEDURE — 80053 COMPREHEN METABOLIC PANEL: CPT

## 2018-08-29 PROCEDURE — 85025 COMPLETE CBC W/AUTO DIFF WBC: CPT

## 2018-08-29 PROCEDURE — 700105 HCHG RX REV CODE 258: Performed by: INTERNAL MEDICINE

## 2018-08-29 PROCEDURE — 700111 HCHG RX REV CODE 636 W/ 250 OVERRIDE (IP): Performed by: EMERGENCY MEDICINE

## 2018-08-29 PROCEDURE — 160038 HCHG SURGERY MINUTES - EA ADDL 1 MIN LEVEL 2: Performed by: ORTHOPAEDIC SURGERY

## 2018-08-29 PROCEDURE — 700102 HCHG RX REV CODE 250 W/ 637 OVERRIDE(OP): Performed by: INTERNAL MEDICINE

## 2018-08-29 PROCEDURE — 36415 COLL VENOUS BLD VENIPUNCTURE: CPT

## 2018-08-29 PROCEDURE — 501838 HCHG SUTURE GENERAL: Performed by: ORTHOPAEDIC SURGERY

## 2018-08-29 PROCEDURE — 83605 ASSAY OF LACTIC ACID: CPT

## 2018-08-29 PROCEDURE — 160009 HCHG ANES TIME/MIN: Performed by: ORTHOPAEDIC SURGERY

## 2018-08-29 PROCEDURE — 87075 CULTR BACTERIA EXCEPT BLOOD: CPT

## 2018-08-29 PROCEDURE — 700102 HCHG RX REV CODE 250 W/ 637 OVERRIDE(OP)

## 2018-08-29 PROCEDURE — 160035 HCHG PACU - 1ST 60 MINS PHASE I: Performed by: ORTHOPAEDIC SURGERY

## 2018-08-29 PROCEDURE — 770020 HCHG ROOM/CARE - TELE (206)

## 2018-08-29 PROCEDURE — 85730 THROMBOPLASTIN TIME PARTIAL: CPT

## 2018-08-29 PROCEDURE — 87070 CULTURE OTHR SPECIMN AEROBIC: CPT

## 2018-08-29 PROCEDURE — 87205 SMEAR GRAM STAIN: CPT

## 2018-08-29 PROCEDURE — 700111 HCHG RX REV CODE 636 W/ 250 OVERRIDE (IP)

## 2018-08-29 PROCEDURE — 87186 SC STD MICRODIL/AGAR DIL: CPT

## 2018-08-29 PROCEDURE — 160002 HCHG RECOVERY MINUTES (STAT): Performed by: ORTHOPAEDIC SURGERY

## 2018-08-29 PROCEDURE — 0KBC0ZZ EXCISION OF RIGHT HAND MUSCLE, OPEN APPROACH: ICD-10-PCS | Performed by: ORTHOPAEDIC SURGERY

## 2018-08-29 PROCEDURE — 99223 1ST HOSP IP/OBS HIGH 75: CPT | Performed by: INTERNAL MEDICINE

## 2018-08-29 PROCEDURE — 87040 BLOOD CULTURE FOR BACTERIA: CPT | Mod: 91

## 2018-08-29 PROCEDURE — 85610 PROTHROMBIN TIME: CPT

## 2018-08-29 PROCEDURE — 700111 HCHG RX REV CODE 636 W/ 250 OVERRIDE (IP): Performed by: INTERNAL MEDICINE

## 2018-08-29 PROCEDURE — A9270 NON-COVERED ITEM OR SERVICE: HCPCS | Performed by: INTERNAL MEDICINE

## 2018-08-29 RX ORDER — MIDAZOLAM HYDROCHLORIDE 1 MG/ML
INJECTION INTRAMUSCULAR; INTRAVENOUS
Status: COMPLETED
Start: 2018-08-29 | End: 2018-08-29

## 2018-08-29 RX ORDER — OXYCODONE HYDROCHLORIDE AND ACETAMINOPHEN 5; 325 MG/1; MG/1
TABLET ORAL
Status: COMPLETED
Start: 2018-08-29 | End: 2018-08-29

## 2018-08-29 RX ORDER — SODIUM CHLORIDE 9 MG/ML
30 INJECTION, SOLUTION INTRAVENOUS
Status: DISCONTINUED | OUTPATIENT
Start: 2018-08-29 | End: 2018-09-02 | Stop reason: HOSPADM

## 2018-08-29 RX ORDER — ONDANSETRON 4 MG/1
4 TABLET, ORALLY DISINTEGRATING ORAL EVERY 4 HOURS PRN
Status: DISCONTINUED | OUTPATIENT
Start: 2018-08-29 | End: 2018-09-02 | Stop reason: HOSPADM

## 2018-08-29 RX ORDER — MAGNESIUM HYDROXIDE 1200 MG/15ML
LIQUID ORAL
Status: COMPLETED | OUTPATIENT
Start: 2018-08-29 | End: 2018-08-29

## 2018-08-29 RX ORDER — OXYCODONE HYDROCHLORIDE 10 MG/1
10 TABLET ORAL
Status: DISCONTINUED | OUTPATIENT
Start: 2018-08-29 | End: 2018-09-02 | Stop reason: HOSPADM

## 2018-08-29 RX ORDER — KETOROLAC TROMETHAMINE 30 MG/ML
30 INJECTION, SOLUTION INTRAMUSCULAR; INTRAVENOUS EVERY 6 HOURS PRN
Status: DISCONTINUED | OUTPATIENT
Start: 2018-08-29 | End: 2018-09-02 | Stop reason: HOSPADM

## 2018-08-29 RX ORDER — SODIUM CHLORIDE 9 MG/ML
INJECTION, SOLUTION INTRAVENOUS CONTINUOUS
Status: DISCONTINUED | OUTPATIENT
Start: 2018-08-29 | End: 2018-08-30

## 2018-08-29 RX ORDER — PROMETHAZINE HYDROCHLORIDE 25 MG/1
12.5-25 SUPPOSITORY RECTAL EVERY 4 HOURS PRN
Status: DISCONTINUED | OUTPATIENT
Start: 2018-08-29 | End: 2018-09-02 | Stop reason: HOSPADM

## 2018-08-29 RX ORDER — BISACODYL 10 MG
10 SUPPOSITORY, RECTAL RECTAL
Status: DISCONTINUED | OUTPATIENT
Start: 2018-08-29 | End: 2018-09-02 | Stop reason: HOSPADM

## 2018-08-29 RX ORDER — ACETAMINOPHEN 325 MG/1
650 TABLET ORAL EVERY 6 HOURS PRN
Status: DISCONTINUED | OUTPATIENT
Start: 2018-08-29 | End: 2018-09-02 | Stop reason: HOSPADM

## 2018-08-29 RX ORDER — CLINDAMYCIN PHOSPHATE 900 MG/50ML
900 INJECTION, SOLUTION INTRAVENOUS EVERY 8 HOURS
Status: DISCONTINUED | OUTPATIENT
Start: 2018-08-29 | End: 2018-08-29

## 2018-08-29 RX ORDER — HYDROMORPHONE HYDROCHLORIDE 2 MG/ML
INJECTION, SOLUTION INTRAMUSCULAR; INTRAVENOUS; SUBCUTANEOUS
Status: COMPLETED
Start: 2018-08-29 | End: 2018-08-29

## 2018-08-29 RX ORDER — POLYETHYLENE GLYCOL 3350 17 G/17G
1 POWDER, FOR SOLUTION ORAL
Status: DISCONTINUED | OUTPATIENT
Start: 2018-08-29 | End: 2018-09-02 | Stop reason: HOSPADM

## 2018-08-29 RX ORDER — ONDANSETRON 2 MG/ML
4 INJECTION INTRAMUSCULAR; INTRAVENOUS EVERY 4 HOURS PRN
Status: DISCONTINUED | OUTPATIENT
Start: 2018-08-29 | End: 2018-09-02 | Stop reason: HOSPADM

## 2018-08-29 RX ORDER — PROMETHAZINE HYDROCHLORIDE 25 MG/1
12.5-25 TABLET ORAL EVERY 4 HOURS PRN
Status: DISCONTINUED | OUTPATIENT
Start: 2018-08-29 | End: 2018-09-02 | Stop reason: HOSPADM

## 2018-08-29 RX ORDER — SODIUM CHLORIDE 9 MG/ML
1000 INJECTION, SOLUTION INTRAVENOUS
Status: DISCONTINUED | OUTPATIENT
Start: 2018-08-29 | End: 2018-09-02 | Stop reason: HOSPADM

## 2018-08-29 RX ORDER — KETOROLAC TROMETHAMINE 30 MG/ML
30 INJECTION, SOLUTION INTRAMUSCULAR; INTRAVENOUS ONCE
Status: COMPLETED | OUTPATIENT
Start: 2018-08-29 | End: 2018-08-29

## 2018-08-29 RX ORDER — OXYCODONE HYDROCHLORIDE 5 MG/1
5 TABLET ORAL
Status: DISCONTINUED | OUTPATIENT
Start: 2018-08-29 | End: 2018-09-02 | Stop reason: HOSPADM

## 2018-08-29 RX ORDER — AMOXICILLIN 250 MG
2 CAPSULE ORAL 2 TIMES DAILY
Status: DISCONTINUED | OUTPATIENT
Start: 2018-08-29 | End: 2018-09-02 | Stop reason: HOSPADM

## 2018-08-29 RX ORDER — MORPHINE SULFATE 4 MG/ML
4 INJECTION, SOLUTION INTRAMUSCULAR; INTRAVENOUS
Status: DISCONTINUED | OUTPATIENT
Start: 2018-08-29 | End: 2018-09-02 | Stop reason: HOSPADM

## 2018-08-29 RX ADMIN — HYDROMORPHONE HYDROCHLORIDE 0.5 MG: 2 INJECTION INTRAMUSCULAR; INTRAVENOUS; SUBCUTANEOUS at 21:45

## 2018-08-29 RX ADMIN — SODIUM CHLORIDE 1000 ML: 9 INJECTION, SOLUTION INTRAVENOUS at 23:42

## 2018-08-29 RX ADMIN — PIPERACILLIN AND TAZOBACTAM 3.38 G: 3; .375 INJECTION, POWDER, LYOPHILIZED, FOR SOLUTION INTRAVENOUS; PARENTERAL at 05:59

## 2018-08-29 RX ADMIN — FENTANYL CITRATE 50 MCG: 50 INJECTION, SOLUTION INTRAMUSCULAR; INTRAVENOUS at 21:49

## 2018-08-29 RX ADMIN — VANCOMYCIN HYDROCHLORIDE 1900 MG: 100 INJECTION, POWDER, LYOPHILIZED, FOR SOLUTION INTRAVENOUS at 04:50

## 2018-08-29 RX ADMIN — KETOROLAC TROMETHAMINE 30 MG: 30 INJECTION, SOLUTION INTRAMUSCULAR at 01:29

## 2018-08-29 RX ADMIN — HYDROMORPHONE HYDROCHLORIDE 0.5 MG: 2 INJECTION INTRAMUSCULAR; INTRAVENOUS; SUBCUTANEOUS at 22:13

## 2018-08-29 RX ADMIN — MORPHINE SULFATE 4 MG: 4 INJECTION INTRAVENOUS at 00:14

## 2018-08-29 RX ADMIN — CLINDAMYCIN IN 5 PERCENT DEXTROSE 900 MG: 18 INJECTION, SOLUTION INTRAVENOUS at 12:51

## 2018-08-29 RX ADMIN — OXYCODONE HYDROCHLORIDE 10 MG: 10 TABLET ORAL at 04:33

## 2018-08-29 RX ADMIN — MORPHINE SULFATE 4 MG: 4 INJECTION INTRAVENOUS at 11:07

## 2018-08-29 RX ADMIN — FENTANYL CITRATE 25 MCG: 50 INJECTION, SOLUTION INTRAMUSCULAR; INTRAVENOUS at 22:18

## 2018-08-29 RX ADMIN — PIPERACILLIN SODIUM AND TAZOBACTAM SODIUM 3.38 G: 3; .375 INJECTION, POWDER, FOR SOLUTION INTRAVENOUS at 01:45

## 2018-08-29 RX ADMIN — AMPICILLIN AND SULBACTAM 3 G: 2; 1 INJECTION, POWDER, FOR SOLUTION INTRAVENOUS at 00:14

## 2018-08-29 RX ADMIN — FENTANYL CITRATE 75 MCG: 50 INJECTION, SOLUTION INTRAMUSCULAR; INTRAVENOUS at 22:08

## 2018-08-29 RX ADMIN — OXYCODONE HYDROCHLORIDE 10 MG: 10 TABLET ORAL at 12:51

## 2018-08-29 RX ADMIN — CLINDAMYCIN IN 5 PERCENT DEXTROSE 900 MG: 18 INJECTION, SOLUTION INTRAVENOUS at 04:53

## 2018-08-29 RX ADMIN — OXYCODONE HYDROCHLORIDE 10 MG: 10 TABLET ORAL at 16:36

## 2018-08-29 RX ADMIN — HYDROMORPHONE HYDROCHLORIDE 0.5 MG: 2 INJECTION INTRAMUSCULAR; INTRAVENOUS; SUBCUTANEOUS at 21:54

## 2018-08-29 RX ADMIN — MIDAZOLAM 1 MG: 1 INJECTION INTRAMUSCULAR; INTRAVENOUS at 22:12

## 2018-08-29 RX ADMIN — HYDROMORPHONE HYDROCHLORIDE 0.5 MG: 2 INJECTION INTRAMUSCULAR; INTRAVENOUS; SUBCUTANEOUS at 22:03

## 2018-08-29 RX ADMIN — FENTANYL CITRATE 50 MCG: 50 INJECTION, SOLUTION INTRAMUSCULAR; INTRAVENOUS at 21:58

## 2018-08-29 RX ADMIN — OXYCODONE HYDROCHLORIDE 10 MG: 10 TABLET ORAL at 08:15

## 2018-08-29 RX ADMIN — KETOROLAC TROMETHAMINE 30 MG: 30 INJECTION, SOLUTION INTRAMUSCULAR at 23:49

## 2018-08-29 RX ADMIN — ONDANSETRON 4 MG: 2 INJECTION INTRAMUSCULAR; INTRAVENOUS at 00:14

## 2018-08-29 RX ADMIN — DOCUSATE SODIUM -SENNOSIDES 2 TABLET: 50; 8.6 TABLET, COATED ORAL at 17:08

## 2018-08-29 RX ADMIN — VANCOMYCIN HYDROCHLORIDE 900 MG: 100 INJECTION, POWDER, LYOPHILIZED, FOR SOLUTION INTRAVENOUS at 14:27

## 2018-08-29 RX ADMIN — OXYCODONE AND ACETAMINOPHEN 2 TABLET: 5; 325 TABLET ORAL at 21:47

## 2018-08-29 RX ADMIN — HYDROMORPHONE HYDROCHLORIDE 0.5 MG: 2 INJECTION INTRAMUSCULAR; INTRAVENOUS; SUBCUTANEOUS at 22:42

## 2018-08-29 RX ADMIN — SODIUM CHLORIDE 1000 ML: 9 INJECTION, SOLUTION INTRAVENOUS at 04:43

## 2018-08-29 RX ADMIN — MIDAZOLAM 1 MG: 1 INJECTION INTRAMUSCULAR; INTRAVENOUS at 22:02

## 2018-08-29 RX ADMIN — VANCOMYCIN HYDROCHLORIDE 900 MG: 100 INJECTION, POWDER, LYOPHILIZED, FOR SOLUTION INTRAVENOUS at 23:42

## 2018-08-29 ASSESSMENT — COGNITIVE AND FUNCTIONAL STATUS - GENERAL
SUGGESTED CMS G CODE MODIFIER DAILY ACTIVITY: CH
DAILY ACTIVITIY SCORE: 24
SUGGESTED CMS G CODE MODIFIER MOBILITY: CH
MOBILITY SCORE: 24

## 2018-08-29 ASSESSMENT — ENCOUNTER SYMPTOMS
WHEEZING: 0
HEADACHES: 0
COUGH: 0
BRUISES/BLEEDS EASILY: 0
SPUTUM PRODUCTION: 0
FOCAL WEAKNESS: 0
DIZZINESS: 0
PALPITATIONS: 0
VOMITING: 0
MYALGIAS: 0
ABDOMINAL PAIN: 0
SHORTNESS OF BREATH: 0
NAUSEA: 0
FEVER: 0
SORE THROAT: 0
BACK PAIN: 0
FLANK PAIN: 0
BLURRED VISION: 0
DIAPHORESIS: 0
CHILLS: 1
SEIZURES: 0
NECK PAIN: 0
BLOOD IN STOOL: 0
DIARRHEA: 0

## 2018-08-29 ASSESSMENT — LIFESTYLE VARIABLES
EVER FELT BAD OR GUILTY ABOUT YOUR DRINKING: NO
CONSUMPTION TOTAL: NEGATIVE
EVER_SMOKED: YES
AVERAGE NUMBER OF DAYS PER WEEK YOU HAVE A DRINK CONTAINING ALCOHOL: 3
ON A TYPICAL DAY WHEN YOU DRINK ALCOHOL HOW MANY DRINKS DO YOU HAVE: 2
TOTAL SCORE: 0
TOTAL SCORE: 0
ALCOHOL_USE: YES
HOW MANY TIMES IN THE PAST YEAR HAVE YOU HAD 5 OR MORE DRINKS IN A DAY: 0
TOTAL SCORE: 0
EVER HAD A DRINK FIRST THING IN THE MORNING TO STEADY YOUR NERVES TO GET RID OF A HANGOVER: NO
HAVE PEOPLE ANNOYED YOU BY CRITICIZING YOUR DRINKING: NO
HAVE YOU EVER FELT YOU SHOULD CUT DOWN ON YOUR DRINKING: NO

## 2018-08-29 ASSESSMENT — COPD QUESTIONNAIRES
HAVE YOU SMOKED AT LEAST 100 CIGARETTES IN YOUR ENTIRE LIFE: NO/DON'T KNOW
IN THE PAST 12 MONTHS DO YOU DO LESS THAN YOU USED TO BECAUSE OF YOUR BREATHING PROBLEMS: DISAGREE/UNSURE
DURING THE PAST 4 WEEKS HOW MUCH DID YOU FEEL SHORT OF BREATH: NONE/LITTLE OF THE TIME
DO YOU EVER COUGH UP ANY MUCUS OR PHLEGM?: NO/ONLY WITH OCCASIONAL COLDS OR INFECTIONS

## 2018-08-29 ASSESSMENT — PAIN SCALES - GENERAL
PAINLEVEL_OUTOF10: 4
PAINLEVEL_OUTOF10: 8
PAINLEVEL_OUTOF10: 6
PAINLEVEL_OUTOF10: 10
PAINLEVEL_OUTOF10: 7
PAINLEVEL_OUTOF10: 5
PAINLEVEL_OUTOF10: 4
PAINLEVEL_OUTOF10: 7
PAINLEVEL_OUTOF10: 10

## 2018-08-29 ASSESSMENT — PATIENT HEALTH QUESTIONNAIRE - PHQ9
2. FEELING DOWN, DEPRESSED, IRRITABLE, OR HOPELESS: NOT AT ALL
SUM OF ALL RESPONSES TO PHQ9 QUESTIONS 1 AND 2: 0
1. LITTLE INTEREST OR PLEASURE IN DOING THINGS: NOT AT ALL

## 2018-08-29 NOTE — PROGRESS NOTES
Pt resting in bed at this time. Even visible chest rise. No signs of distress.Call light in place. Bed in low and locked position. Hourly rounding in place.

## 2018-08-29 NOTE — ASSESSMENT & PLAN NOTE
Patient admitted with progressive disease and started on broad-spectrum antibiotics with Vanc and Zosyn. He is s/p  Irrigation and debridement, right long finger flexor 8/29. Edema and ROM is improved.   Wound culture grew moderate GPC speciated to be MRSA this morning sensitive to most antibiotics. I will discontinue his Vancomycin and observe him on Bactrim overnight before discharge in a.m. He will require treatment for 2 weeks total.   Appreciate ortho assistant with patient.  Pain control with oxycodone, IV morphine and IV Toradol, monitor respiratory status closely. Should require less pain management.

## 2018-08-29 NOTE — PROGRESS NOTES
"Pharmacy Kinetics 28 y.o. male on vancomycin day # 1 2018    Indication for Treatment: Tenosynotivitis    Pertinent history per medical record: Admitted on 2018 for right hand pain and swelling, diagnosed as tenosynovitis. Patient admitted day prior to presentation for same complaint however left AMA. With pain now radiating down arm, empiric antibiotic therapy escalated empirically to include vancomycin.    Other antibiotics: Piperacillin/tazobactam 3.375 g IV q6h, clindamycin 900 mg IV q8h    Allergies: Patient has no known allergies.     List concerns for renal function: None    Pertinent cultures to date:     None    Recent Labs      18   2215  18   0020   WBC  10.6  17.5*   NEUTSPOLYS  58.70  70.00     Recent Labs      18   2215  18   0020   BUN  16  10   CREATININE  0.79  0.69   ALBUMIN  4.3  4.2     Blood pressure 146/78, pulse 71, temperature 36.9 °C (98.4 °F), resp. rate 20, height 1.727 m (5' 8\"), weight 76.9 kg (169 lb 8.5 oz), SpO2 96 %. Temp (24hrs), Av.9 °C (98.4 °F), Min:36.9 °C (98.4 °F), Max:36.9 °C (98.4 °F)      A/P   1. Vancomycin dose change: Give 1900 mg IV loading dose followed by 900 mg IV q8h  2. Next vancomycin level: Trough in ~2 days (not ordered)  3. Goal trough: 12-16 mcg/mL  4. Comments: Therapy with vancomycin initiated empirically for SSTI. With no major concerns for accumulation, will provide loading dose and start scheduled dosing thereafter as outlined above, planning to check trough level when patient closer to steady state in order to ensure appropriate clearance and drug levels. Recommend de-escalation if MRSA can be ruled out.  Pharmacy will continue to follow.     Bob PaceD, BCPS    "

## 2018-08-29 NOTE — H&P
Hospital Medicine History & Physical Note    Date of Service  8/29/2018    Primary Care Physician  Pcp Pt States None    Consultants  Surgery     Code Status  Full Code    Chief Complaint  Right hand pain    History of Presenting Illness  28 y.o. male who presented 8/28/2018 with worsening right hand pain.  Patient was admitted by me yesterday and was diagnosed with infectious tenosynovitis of his right middle finger after dropping heavy furniture on it 1 week ago.  He was evaluated by surgery and was started on IV antibiotics however the patient decided to leave AMA due to anxiety of possible surgery.  Patient returns with worsening pain and swelling of his right middle finger which is now radiating down his forearm to his bicep and chest.  The patient reports excruciating pain and is in tears.  Flexion of his finger or wrist leads to worsening of the pain.  The patient received IV morphine 4 mg with minimal relief.  He reports chills.  His WBC count has spiked to 17.5.  I have requested a urgent surgical evaluation for concerns of ascending infectious tenosynovitis.    Review of Systems  Review of Systems   Constitutional: Positive for chills. Negative for diaphoresis and fever.   HENT: Negative for hearing loss and sore throat.    Eyes: Negative for blurred vision.   Respiratory: Negative for cough, sputum production, shortness of breath and wheezing.    Cardiovascular: Negative for chest pain, palpitations and leg swelling.   Gastrointestinal: Negative for abdominal pain, blood in stool, diarrhea, nausea and vomiting.   Genitourinary: Negative for dysuria, flank pain and urgency.   Musculoskeletal: Negative for back pain, joint pain, myalgias and neck pain.        Right middle finger pain radiates down to his arm and right chest   Skin: Negative for rash.   Neurological: Negative for dizziness, focal weakness, seizures and headaches.   Endo/Heme/Allergies: Does not bruise/bleed easily.   Psychiatric/Behavioral:  Negative for suicidal ideas.   All other systems reviewed and are negative.      Past Medical History  No pertinent medical history    Surgical History  No pertinent surgical history    Family History  History reviewed.  No pertinent family history    Social History   reports that he has been smoking Cigarettes.  He has never used smokeless tobacco. He reports that he drinks alcohol. He reports that he does not use drugs.    Allergies  No Known Allergies    Medications  Prior to Admission Medications   Prescriptions Last Dose Informant Patient Reported? Taking?   ibuprofen (MOTRIN) 200 MG Tab 8/28/2018 at 1000  Yes Yes   Sig: Take 400 mg by mouth every 8 hours as needed (Pain).      Facility-Administered Medications: None       Physical Exam  Blood Pressure: 146/78   Temperature: 36.9 °C (98.4 °F)   Pulse: 71   Respiration: 20   Pulse Oximetry: 96 %     Physical Exam   Constitutional: He is oriented to person, place, and time. He appears well-developed and well-nourished. No distress.   HENT:   Head: Normocephalic and atraumatic.   Mouth/Throat: Oropharynx is clear and moist.   Eyes: Pupils are equal, round, and reactive to light. Conjunctivae are normal. No scleral icterus.   Neck: Normal range of motion. Neck supple.   Cardiovascular: Normal rate, regular rhythm and normal heart sounds.    Pulmonary/Chest: Effort normal and breath sounds normal. No respiratory distress. He has no wheezes. He has no rales.   Abdominal: Soft. Bowel sounds are normal. He exhibits no distension. There is no tenderness. There is no rebound.   Musculoskeletal: Normal range of motion. He exhibits no edema or tenderness.   Worsening swelling noted of right third digit, limited range of motion due to pain and swelling.  Tenderness to palpation of forearm and medial aspect of bicep.   Lymphadenopathy:     He has no cervical adenopathy.   Neurological: He is alert and oriented to person, place, and time. No cranial nerve deficit.  Coordination normal.   Skin: Skin is warm. No rash noted.   Psychiatric: He has a normal mood and affect. His behavior is normal.   Nursing note and vitals reviewed.      Laboratory:  Recent Labs      08/27/18 2215 08/29/18   0020   WBC  10.6  17.5*   RBC  4.77  4.76   HEMOGLOBIN  12.8*  12.9*   HEMATOCRIT  40.1*  39.4*   MCV  84.1  82.8   MCH  26.8*  27.1   MCHC  31.9*  32.7*   RDW  41.4  39.1   PLATELETCT  191  190   MPV  12.0  11.9     Recent Labs      08/27/18 2215   SODIUM  137   POTASSIUM  3.8   CHLORIDE  105   CO2  23   GLUCOSE  87   BUN  16   CREATININE  0.79   CALCIUM  9.4     Recent Labs      08/27/18 2215   ALTSGPT  15   ASTSGOT  18   ALKPHOSPHAT  119*   TBILIRUBIN  0.5   GLUCOSE  87                 No results found for: TROPONINI    Urinalysis:    No results found     Imaging:  No orders to display         Assessment/Plan:  I anticipate this patient will require at least two midnights for appropriate medical management, necessitating inpatient admission.    Infectious tenosynovitis- (present on admission)   Assessment & Plan    Patient has developed worsening pain, swelling which is now extending up to his forearm, bicep and medial chest  I will start the patient on IV Zosyn, IV vancomycin and IV clindamycin.  Monitor for vancomycin toxicity  I have requested an urgent surgical evaluation,  on call for hand however she is out of town at this time. We will have Dr Maldonado who has seen the patient previously, evaluate the patient in the morning  Check blood cultures  Pain control with oxycodone, IV morphine and IV Toradol, monitor respiratory status closely          Leukocytosis- (present on admission)   Assessment & Plan    Likely secondary to worsening infection  Monitor for progression to sepsis  Monitor CBC and vitals        Tobacco abuse- (present on admission)   Assessment & Plan    Tobacco cessation counseling reinforced            VTE prophylaxis: SCD

## 2018-08-29 NOTE — ED PROVIDER NOTES
"ED Provider Note    CHIEF COMPLAINT  Chief Complaint   Patient presents with   • Digit Pain     [place] fell on finger when he was helping his friend move       HPI  Ramone Sal is a 28 y.o. male here for evaluation of right middle finger pain.  The patient was moving a dresser the other day, when it fell onto his finger.  He was seen and evaluated earlier in the day, and admitted for flexor tenosynovitis.  He has seen Orth O upstairs, and was scheduled to possibly have surgery tomorrow morning.  The patient experienced some anxiety, and decided that he wanted to leave.  He left prior to talking to the physician, and signed out AMA.      PAST MEDICAL HISTORY   Flexor tenosynovitis    SOCIAL HISTORY  Social History     Social History Main Topics   • Smoking status: Current Every Day Smoker     Types: Cigarettes   • Smokeless tobacco: Never Used   • Alcohol use Yes      Comment: social, 2 beers per week   • Drug use: No   • Sexual activity: Not on file       SURGICAL HISTORY  patient denies any surgical history    CURRENT MEDICATIONS  Home Medications     Reviewed by Vipin Gaitan (Pharmacy Tech) on 08/28/18 at 2354  Med List Status: Complete   Medication Last Dose Status   ibuprofen (MOTRIN) 200 MG Tab 8/28/2018 Active                ALLERGIES  No Known Allergies    REVIEW OF SYSTEMS  See HPI for further details. Review of systems as above, otherwise all other systems are negative.     PHYSICAL EXAM  VITAL SIGNS: /78   Pulse 79   Temp 36.9 °C (98.4 °F)   Resp 20   Ht 1.727 m (5' 8\")   Wt 76.9 kg (169 lb 8.5 oz)   SpO2 97%   BMI 25.78 kg/m²     Constitutional: Well developed, well nourished. No acute distress.  HEENT: Normocephalic, atraumatic. MMM  Neck: Supple, Full range of motion   Chest/Pulmonary:  No respiratory distress.  Equal expansion   Musculoskeletal: No deformity,  neurovascular intact.    Right hand:  Middle digit with edema and tenderness with ROM.  Mild " erythema.   Neuro: Clear speech, appropriate, cooperative, cranial nerves II-XII grossly intact.  Psych: anxious.       PROCEDURES     MEDICAL RECORD  I have reviewed patient's medical record and pertinent results are listed above.    COURSE & MEDICAL DECISION MAKING  I have reviewed any medical record information, laboratory studies and radiographic results as noted above.    12:01 AM  I spoke to Dr. Corona, who will re-admit the pt to the hospital.  Ortho was consulted yesterday.    1:59 AM  Dr. Corona would like hand consulted.  We paged Dr. Swanson, but were told she is out of town until sept 4.    Dr. Corona aware.  Pt still has ortho (hortensia) who was going to see the pt in the am, from yesterday.     I you have had any blood pressure issues while here in the emergency department, please see your doctor for a further evaluation or work up.    Differential diagnoses include but not limited to: flexor tenosynovitis, felon, cellulitis     FINAL IMPRESSION  1. Flexor tenosynovitis of finger    Electronically signed by: Rafael Dave, 8/28/2018 11:59 PM

## 2018-08-29 NOTE — PROGRESS NOTES
Dr. Little notified of patient desire to discharge home with oral antibiotics. He recommends patient stay until recommended IV antibiotics are administered and determine if surgery is required. Patient notified. Patient agreeable to stay.

## 2018-08-29 NOTE — PROGRESS NOTES
Pt stated he had cigarettes in his possession. This RN placed the cigarettes in the pt drawer with a pt sticker on them. Pt notified that he would get them back once discharged and notified of location.

## 2018-08-29 NOTE — ED NOTES
Pt was admitted yesterday for antibiotics and possible I/D. Pt felt anxious and left AMA this morning Now back for similar complaint

## 2018-08-29 NOTE — PROGRESS NOTES
2 RN Skin check complete with GEORGIE Lee:    Pt has abscess on R middle finger, not open. All other skin is clean, dry and intact.

## 2018-08-29 NOTE — THERAPY
PT order received; Will hold PT evaluation at this time as pt is currently awaiting for possible I&D of right hand and surgery consult. Will re-attempt post-op as able

## 2018-08-29 NOTE — PROGRESS NOTES
Patient with tenosynovitis who left AMA yesterday but returned later and readmitted this morning for worsening infection. Arm is more swollen and leukocytosis of 17k. Now on broad-spectrum IV antibiotics. Awaiting ortho evaluation and potential drainage today.  Hemodynamically stable currently.

## 2018-08-29 NOTE — PROGRESS NOTES
Received pt from ED. Bedside report completed. Pt lying in bed comfortably. A/O x 4.Safety precautions in place. Call light and personal belongings within reach. Pt educated on POC and all questions answered at this time.  Educated patient on calling for assistance when needed. All pt needs are met at this time.  Will continue to monitor.

## 2018-08-29 NOTE — PROGRESS NOTES
Assumed care of PT A&O 4. Pt resting in bed with no signs of labored breathing. On RA. Tele monitor in place, cardiac rhythm being monitored. Call light within reach, bed in lowest position, upper bed rails up. Pt was updated on plan of care for the day. Will continue to monitor. NPO for possible I&D today.

## 2018-08-29 NOTE — THERAPY
OT orders received. Will hold OT eval until consult for possible sx for R hand is completed.     Winter Bee, OTR/L  Pager: [phone]

## 2018-08-29 NOTE — CARE PLAN
Problem: Knowledge Deficit  Goal: Knowledge of disease process/condition, treatment plan, diagnostic tests, and medications will improve  Outcome: PROGRESSING AS EXPECTED  Patient is educated of disease process and condition. Treatment team has included patient in plan of care. All medications indications and side effects are explained. Patient is encouraged to ask questions. Patient indicates understanding.    Problem: Pain Management  Goal: Pain level will decrease to patient's comfort goal  Outcome: PROGRESSING AS EXPECTED  Pt is able to verbalize pain on a scale of 1-10 and is able to verbalize comfort goal. Pain management plan followed and pt educated on nonpharmacologic and pharmacological comfort measures.

## 2018-08-29 NOTE — DISCHARGE SUMMARY
Discharge Summary    CHIEF COMPLAINT ON ADMISSION  Chief Complaint   Patient presents with   • Digit Pain     Pt had a dresser drop on his R middle finger last week, reports continues to become more swollen and is pulsating now causing more pain.        Reason for Admission  Finger pain      Admission Date  8/27/2018    CODE STATUS  Full Code    HPI & HOSPITAL COURSE  This is a 28 y.o. male admitted with acute tenosynovitis. Evaluated by ortho with recommendation for IV antibiotics x24 hours. Patient left AMA; ran out of the hospital before he could be counseled otherwise.        Therefore, he is discharged in guarded and stable condition against medcial advice.      Discharge Date  8/28/2018    FOLLOW UP ITEMS POST DISCHARGE  LEFT AMA    DISCHARGE DIAGNOSES  Active Problems:    Flexor tenosynovitis of finger POA: Yes    Tobacco abuse POA: Unknown  Resolved Problems:    * No resolved hospital problems. *      FOLLOW UP  No future appointments.  No follow-up provider specified.    MEDICATIONS ON DISCHARGE     Medication List      You have not been prescribed any medications.         Allergies  No Known Allergies    DIET  Orders Placed This Encounter   Procedures   • Diet Order Regular     Standing Status:   Standing     Number of Occurrences:   1     Order Specific Question:   Diet:     Answer:   Regular [1]   • Diet NPO     Standing Status:   Standing     Number of Occurrences:   1     Order Specific Question:   Restrict to:     Answer:   Sips with Medications [3]   • DIET NPO     Standing Status:   Standing     Number of Occurrences:   8     Order Specific Question:   Type:     Answer:   At Midnight [5]     Order Specific Question:   Restrict to:     Answer:   Strict [1]       ACTIVITY  LEFT AMA    CONSULTATIONS  Orthopedic surgery.    PROCEDURES  None    LABORATORY  Lab Results   Component Value Date    SODIUM 137 08/27/2018    POTASSIUM 3.8 08/27/2018    CHLORIDE 105 08/27/2018    CO2 23 08/27/2018    GLUCOSE 87  08/27/2018    BUN 16 08/27/2018    CREATININE 0.79 08/27/2018        Lab Results   Component Value Date    WBC 10.6 08/27/2018    HEMOGLOBIN 12.8 (L) 08/27/2018    HEMATOCRIT 40.1 (L) 08/27/2018    PLATELETCT 191 08/27/2018        Total time of the discharge process less than 5 minutes.

## 2018-08-29 NOTE — PROGRESS NOTES
Pt left against medical advice. IV removed. Pt stated he was to anxious to stay another night. I told him we could see what we could get him for anxiety he continued to decline. Pt was informed that if he left AMA his insurance may not cover his stay and he was still refused to stay. Pt also refused to sign AMA paperwork or wait for the doctor to talk to him. Dr. Little notified.

## 2018-08-29 NOTE — ED TRIAGE NOTES
"Chief Complaint   Patient presents with   • Digit Pain     [place] fell on finger when he was helping his friend move     /78   Pulse 79   Temp 36.9 °C (98.4 °F)   Resp 20   Ht 1.727 m (5' 8\")   Wt 76.9 kg (169 lb 8.5 oz)   SpO2 97%   BMI 25.78 kg/m²   Patient ambulatory to triage with above complaint. Patient got finger crushed by dresser approx last Wednesday and has been swollen and painful for the last three days. Patient came to ED yesterday for same complaint, xrays were taken and he states that \"I think they gave me antibiotics. During treatment being rendered patient became anxious and \"Freaked out over the thought of potentially having surgery\". Patient is currently visibly calm and cooperative and just \"really wants the pain to go away\". He is currently A&Ox4, VSS and in NAD.  Patient educated on triage process and returned to [facility].  "

## 2018-08-30 LAB
ALBUMIN SERPL BCP-MCNC: 3.8 G/DL (ref 3.2–4.9)
ANION GAP SERPL CALC-SCNC: 8 MMOL/L (ref 0–11.9)
BASOPHILS # BLD AUTO: 0.5 % (ref 0–1.8)
BASOPHILS # BLD: 0.06 K/UL (ref 0–0.12)
BUN SERPL-MCNC: 8 MG/DL (ref 8–22)
CALCIUM SERPL-MCNC: 9.4 MG/DL (ref 8.5–10.5)
CHLORIDE SERPL-SCNC: 107 MMOL/L (ref 96–112)
CO2 SERPL-SCNC: 22 MMOL/L (ref 20–33)
CREAT SERPL-MCNC: 0.74 MG/DL (ref 0.5–1.4)
EOSINOPHIL # BLD AUTO: 0.04 K/UL (ref 0–0.51)
EOSINOPHIL NFR BLD: 0.3 % (ref 0–6.9)
ERYTHROCYTE [DISTWIDTH] IN BLOOD BY AUTOMATED COUNT: 38.5 FL (ref 35.9–50)
GLUCOSE SERPL-MCNC: 91 MG/DL (ref 65–99)
GRAM STN SPEC: NORMAL
HCT VFR BLD AUTO: 38.7 % (ref 42–52)
HGB BLD-MCNC: 13 G/DL (ref 14–18)
IMM GRANULOCYTES # BLD AUTO: 0.07 K/UL (ref 0–0.11)
IMM GRANULOCYTES NFR BLD AUTO: 0.5 % (ref 0–0.9)
LYMPHOCYTES # BLD AUTO: 2.07 K/UL (ref 1–4.8)
LYMPHOCYTES NFR BLD: 15.6 % (ref 22–41)
MCH RBC QN AUTO: 27.8 PG (ref 27–33)
MCHC RBC AUTO-ENTMCNC: 33.6 G/DL (ref 33.7–35.3)
MCV RBC AUTO: 82.9 FL (ref 81.4–97.8)
MONOCYTES # BLD AUTO: 0.91 K/UL (ref 0–0.85)
MONOCYTES NFR BLD AUTO: 6.9 % (ref 0–13.4)
NEUTROPHILS # BLD AUTO: 10.1 K/UL (ref 1.82–7.42)
NEUTROPHILS NFR BLD: 76.2 % (ref 44–72)
NRBC # BLD AUTO: 0 K/UL
NRBC BLD-RTO: 0 /100 WBC
PHOSPHATE SERPL-MCNC: 3.4 MG/DL (ref 2.5–4.5)
PLATELET # BLD AUTO: 170 K/UL (ref 164–446)
PMV BLD AUTO: 12.3 FL (ref 9–12.9)
POTASSIUM SERPL-SCNC: 3.7 MMOL/L (ref 3.6–5.5)
RBC # BLD AUTO: 4.67 M/UL (ref 4.7–6.1)
SIGNIFICANT IND 70042: NORMAL
SITE SITE: NORMAL
SODIUM SERPL-SCNC: 137 MMOL/L (ref 135–145)
SOURCE SOURCE: NORMAL
VANCOMYCIN TROUGH SERPL-MCNC: 7.3 UG/ML (ref 10–20)
WBC # BLD AUTO: 13.3 K/UL (ref 4.8–10.8)

## 2018-08-30 PROCEDURE — 80202 ASSAY OF VANCOMYCIN: CPT

## 2018-08-30 PROCEDURE — 700102 HCHG RX REV CODE 250 W/ 637 OVERRIDE(OP): Performed by: INTERNAL MEDICINE

## 2018-08-30 PROCEDURE — 80069 RENAL FUNCTION PANEL: CPT

## 2018-08-30 PROCEDURE — 85025 COMPLETE CBC W/AUTO DIFF WBC: CPT

## 2018-08-30 PROCEDURE — 770020 HCHG ROOM/CARE - TELE (206)

## 2018-08-30 PROCEDURE — 700105 HCHG RX REV CODE 258: Performed by: HOSPITALIST

## 2018-08-30 PROCEDURE — 99233 SBSQ HOSP IP/OBS HIGH 50: CPT | Performed by: HOSPITALIST

## 2018-08-30 PROCEDURE — 36415 COLL VENOUS BLD VENIPUNCTURE: CPT

## 2018-08-30 PROCEDURE — 700111 HCHG RX REV CODE 636 W/ 250 OVERRIDE (IP): Performed by: HOSPITALIST

## 2018-08-30 PROCEDURE — A9270 NON-COVERED ITEM OR SERVICE: HCPCS | Performed by: INTERNAL MEDICINE

## 2018-08-30 PROCEDURE — 97535 SELF CARE MNGMENT TRAINING: CPT

## 2018-08-30 PROCEDURE — 700111 HCHG RX REV CODE 636 W/ 250 OVERRIDE (IP): Performed by: INTERNAL MEDICINE

## 2018-08-30 PROCEDURE — 700105 HCHG RX REV CODE 258: Performed by: INTERNAL MEDICINE

## 2018-08-30 RX ADMIN — OXYCODONE HYDROCHLORIDE 10 MG: 10 TABLET ORAL at 20:00

## 2018-08-30 RX ADMIN — OXYCODONE HYDROCHLORIDE 10 MG: 10 TABLET ORAL at 00:15

## 2018-08-30 RX ADMIN — VANCOMYCIN HYDROCHLORIDE 1200 MG: 100 INJECTION, POWDER, LYOPHILIZED, FOR SOLUTION INTRAVENOUS at 23:48

## 2018-08-30 RX ADMIN — OXYCODONE HYDROCHLORIDE 10 MG: 10 TABLET ORAL at 23:49

## 2018-08-30 RX ADMIN — VANCOMYCIN HYDROCHLORIDE 1200 MG: 100 INJECTION, POWDER, LYOPHILIZED, FOR SOLUTION INTRAVENOUS at 15:57

## 2018-08-30 RX ADMIN — OXYCODONE HYDROCHLORIDE 10 MG: 10 TABLET ORAL at 16:03

## 2018-08-30 RX ADMIN — VANCOMYCIN HYDROCHLORIDE 900 MG: 100 INJECTION, POWDER, LYOPHILIZED, FOR SOLUTION INTRAVENOUS at 10:05

## 2018-08-30 ASSESSMENT — PAIN SCALES - GENERAL
PAINLEVEL_OUTOF10: 7
PAINLEVEL_OUTOF10: 7
PAINLEVEL_OUTOF10: 0
PAINLEVEL_OUTOF10: 6
PAINLEVEL_OUTOF10: 7
PAINLEVEL_OUTOF10: 6
PAINLEVEL_OUTOF10: 5
PAINLEVEL_OUTOF10: 8
PAINLEVEL_OUTOF10: 7

## 2018-08-30 ASSESSMENT — ENCOUNTER SYMPTOMS
DIAPHORESIS: 0
GASTROINTESTINAL NEGATIVE: 1
WEAKNESS: 0
EYES NEGATIVE: 1
WEIGHT LOSS: 0
CHILLS: 0
RESPIRATORY NEGATIVE: 1
FEVER: 0

## 2018-08-30 ASSESSMENT — LIFESTYLE VARIABLES: EVER_SMOKED: YES

## 2018-08-30 ASSESSMENT — ACTIVITIES OF DAILY LIVING (ADL): TOILETING: INDEPENDENT

## 2018-08-30 NOTE — THERAPY
Occupational Therapy Contact Note    Per RN and charts, pt is up by self. Provided pt with education on post-acute outpatient hand therapy. Pt currently with bandages on sx site which inhibits movement of finger; unable to assess full AROM. Educated pt on AROM of finger in pain-free range once bandages are removed and uses modalities as needed for swelling. Pt concerned about returning to work and hopes to be able to return Monday.     Winter Bee, OTR/L  Pager: [phone]

## 2018-08-30 NOTE — THERAPY
PT order received; Per RN and OT pt has been up self and ambulating independently. Will defer to OT recs for UE management post-op. Will cancel therapy orders at this time, please reorder if functional status is to change.

## 2018-08-30 NOTE — OR NURSING
"Pt arrived to PACU with oral airway in place. Monitors applied and report received from MD and RN.      Oral airway removed and pt began screaming \"it hurts\" and \"nurse help me\"  Pt medicated per anesthesia orders. Despite interventions, pt remained anxious asking for help.  Dr. Alonso notified and order received for Versed.     Pt sleeping at this time. VSS on RA. Right hand elevated and surgical drsg CDI. Report called to GEORGIE Deluna.  " Monitor Summary   NSR 60 - 80's  0.20/0.10/0.42    12 hour chart check performed at bedside.

## 2018-08-30 NOTE — OP REPORT
DATE OF SERVICE:  08/29/2018    PREOPERATIVE DIAGNOSIS:  Right long finger flexor tenosynovitis.    POSTOPERATIVE DIAGNOSIS:  Right long finger flexor tenosynovitis.    PROCEDURES:  Irrigation and debridement, right long finger flexor   tenosynovitis.    SURGEON:  Bonita Meadows MD    ASSISTANT:  Carlin Barboza DO    ESTIMATED BLOOD LOSS:  None.    INDICATIONS:  This is a 28-year-old male who had a long finger infection.  He   left AMA, so missed surgical treatment yesterday and is now scheduled for   surgery today when he returns with a white count of 7.5.  Risks and benefits   of irrigation and debridement were discussed, which include but not limited to   bleeding, infection, neurovascular damage, pain, stiffness, and need for   further surgery including amputation.  He understands the serious nature of   his injury and wishes to proceed.    DESCRIPTION OF PROCEDURE:  Patient was sedated with LMA anesthesia and   administered preoperative antibiotics.  His right upper extremity was prepped   in the usual sterile fashion.  A standard Brunner type incision was made   directly over his flexor surface and a large amount of purulent drainage was   encountered.  This was sent for culture and sensitivity.  The wound was   irrigated.  It was debrided sharply with a knife of skin, subcutaneous tissue,   and underlying muscle in an excisional fashion and irrigated with copious   amounts of normal saline solution and closed with nylon suture.  Sterile   dressings were applied.  The patient tolerated the procedure well.    POSTOPERATIVE PLAN:  The patient will be admitted while awaiting definitive   culture results, on IV antibiotics and infectious disease consultation.       ____________________________________     BONITA MEADOWS MD    KONRAD / NTS    DD:  08/29/2018 21:22:55  DT:  08/30/2018 00:07:18    D#:  [id number]  Job#:  [id number]

## 2018-08-30 NOTE — PROGRESS NOTES
Assumed care of PT A&O 4. Pt resting in bed with no signs of labored breathing. On RA. Tele monitor in place, cardiac rhythm being monitored. Call light within reach, bed in lowest position, upper bed rails up. Pt was updated on plan of care for the day. Will continue to monitor.

## 2018-08-30 NOTE — PROGRESS NOTES
"Pharmacy Kinetics 28 y.o. male on vancomycin day # 2 2018    Currently on Vancomycin 900 mg iv q8hr    Indication for Treatment: tenosynovitis    Pertinent history per medical record: Admitted on 2018 for right hand pain and swelling, diagnosed as tenosynovitis. Patient admitted day prior to presentation for same complaint however left AMA. With pain now radiating down arm, empiric antibiotic therapy escalated empirically to include vancomycin. 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 S/P I&D .     Other antibiotics: none    Allergies: Patient has no known allergies.     List concerns for renal function: none    Pertinent cultures to date:    PBC x 2: NGTD   R hand: gram positive cocci    Recent Labs      18   0020  18   0814   WBC  10.6  17.5*  13.3*   NEUTSPOLYS  58.70  70.00  76.20*     Recent Labs      18   0020  18   0814   BUN  16  10  8   CREATININE  0.79  0.69  0.74   ALBUMIN  4.3  4.2  3.8     Recent Labs      18   0814   VANCOTROUGH  7.3*     Intake/Output Summary (Last 24 hours) at 18 1337  Last data filed at 18 0900   Gross per 24 hour   Intake             2000 ml   Output             2370 ml   Net             -370 ml      Blood pressure 130/69, pulse 75, temperature 36.5 °C (97.7 °F), resp. rate 20, height 1.727 m (5' 8\"), weight 76.2 kg (167 lb 15.9 oz), SpO2 98 %. Temp (24hrs), Av.1 °C (98.7 °F), Min:36.4 °C (97.6 °F), Max:37.6 °C (99.6 °F)      A/P   1. Vancomycin dose change: increase to 1200 mg IV q8h  2. Next vancomycin level:  @ 1530 (prior to 4th dose)  3. Goal trough: 12-16 mcg/mL  4. Comments: level resulted today substantially subtherapeutic. Do not anticipate patient will accumulate to goal. Will increase dose to 1200 mg IV q8h and plan for a level prior to the 4th dose of this regimen. Renal indices remain stable. Had I&D last night with evidence of purulent fluid. " Cultures are in process. Will continue to follow.    Theodora Khan, PharmD

## 2018-08-30 NOTE — CARE PLAN
Problem: Knowledge Deficit  Goal: Knowledge of disease process/condition, treatment plan, diagnostic tests, and medications will improve  Outcome: PROGRESSING AS EXPECTED  Pt educated about disease process. Reason why medications are taken. And informed about treatment plan.    Problem: Pain Management  Goal: Pain level will decrease to patient's comfort goal  Outcome: PROGRESSING AS EXPECTED  Medicated per MAR. Educated on pain scale. implemented non pharmacological methods: distraction, reposition, rest.

## 2018-08-30 NOTE — PROGRESS NOTES
Dr. Robb with [employer] returned the page for Dr. Maldonado and states pt is going to have the I&D done tonight but the exact time is not known. Pt was updated and agrees to continue NPO status.

## 2018-08-30 NOTE — PROGRESS NOTES
[facility] Hospitalist Progress Note    Date of Service: 2018    Chief Complaint  28 y.o. male admitted 2018 with right middle finger flexor tenosynovitis that extended into the entire hand and arm after he left the hospital AMA but returned subsequently with a more septic picture. HE is stable.     Interval Problem Update  Patient is doing better. He is s/p  Irrigation and debridement, right long finger flexor . Denies fever or chills today. Ready for breakfast as he was NPO all day yesterday for his procedure that occurred around .     Consultants/Specialty  Ortho    Disposition  Home        Review of Systems   Constitutional: Negative for chills, diaphoresis, fever, malaise/fatigue and weight loss.   Eyes: Negative.    Respiratory: Negative.    Gastrointestinal: Negative.    Musculoskeletal: Positive for joint pain.   Neurological: Negative for weakness.      Physical Exam  Laboratory/Imaging   Hemodynamics  Temp (24hrs), Av.1 °C (98.7 °F), Min:36.4 °C (97.6 °F), Max:37.6 °C (99.6 °F)   Temperature: 36.5 °C (97.7 °F)  Pulse  Av.1  Min: 64  Max: 84 Heart Rate (Monitored): 75  Blood Pressure: 130/69, NIBP: 148/96      Respiratory      Respiration: 20, Pulse Oximetry: 98 %        RUL Breath Sounds: Clear, RML Breath Sounds: Clear, RLL Breath Sounds: Clear, AMARILIS Breath Sounds: Clear, LLL Breath Sounds: Clear    Fluids    Intake/Output Summary (Last 24 hours) at 18 1426  Last data filed at 18 0900   Gross per 24 hour   Intake             2000 ml   Output             2370 ml   Net             -370 ml       Nutrition  Orders Placed This Encounter   Procedures   • Diet Order Regular     Standing Status:   Standing     Number of Occurrences:   1     Order Specific Question:   Diet:     Answer:   Regular [1]     Physical Exam   Constitutional: He is oriented to person, place, and time. He appears well-developed and well-nourished. No distress.   HENT:   Head: Normocephalic and atraumatic.    Eyes: Conjunctivae and EOM are normal.   Neck: Normal range of motion. Neck supple.   Cardiovascular: Normal rate, regular rhythm, normal heart sounds and intact distal pulses.    Pulmonary/Chest: Effort normal and breath sounds normal. No respiratory distress. He has no wheezes. He has no rales. He exhibits no tenderness.   Abdominal: Soft. Bowel sounds are normal. He exhibits no distension. There is no tenderness. There is no rebound and no guarding.   Musculoskeletal: Normal range of motion. He exhibits edema and tenderness.   Arm swelling is better than yesterday following surgical intervention. ROM is improved.    Neurological: He is alert and oriented to person, place, and time. He has normal reflexes.   Skin: Skin is dry. He is not diaphoretic. There is erythema.   Residual swelling to the right hand and wrist   Psychiatric: He has a normal mood and affect. His behavior is normal. Judgment and thought content normal.       Recent Labs      08/27/18 2215 08/29/18   0020  08/30/18   0814   WBC  10.6  17.5*  13.3*   RBC  4.77  4.76  4.67*   HEMOGLOBIN  12.8*  12.9*  13.0*   HEMATOCRIT  40.1*  39.4*  38.7*   MCV  84.1  82.8  82.9   MCH  26.8*  27.1  27.8   MCHC  31.9*  32.7*  33.6*   RDW  41.4  39.1  38.5   PLATELETCT  191  190  170   MPV  12.0  11.9  12.3     Recent Labs      08/27/18 2215 08/29/18   0020  08/30/18   0814   SODIUM  137  140  137   POTASSIUM  3.8  3.8  3.7   CHLORIDE  105  108  107   CO2  23  22  22   GLUCOSE  87  96  91   BUN  16  10  8   CREATININE  0.79  0.69  0.74   CALCIUM  9.4  9.4  9.4     Recent Labs      08/29/18   1108   APTT  30.1   INR  1.24*                  Assessment/Plan     Infectious tenosynovitis- (present on admission)   Assessment & Plan    Patient admitted with progressive disease and started on broad-spectrum antibiotics with Vanc and Zosyn. He is s/p  Irrigation and debridement, right long finger flexor 8/29. Edema and ROM is improved.    Monitor for vancomycin  toxicity  Appreciate ortho assistant with patient.  Continue to follow all cultures and adjust antibiotics accordingly. Patient now on Vancomycin.   blood cultures  Pain control with oxycodone, IV morphine and IV Toradol, monitor respiratory status closely          Leukocytosis- (present on admission)   Assessment & Plan    Likely secondary to current infection; improving.  Monitor CBC and vitals as well as cultures.        Tobacco abuse- (present on admission)   Assessment & Plan    Tobacco cessation counseling reinforced          Quality-Core Measures

## 2018-08-30 NOTE — PROGRESS NOTES
Pt is sitting up in the chair claims to be having less pain post I&D. VSS. ANBX running. Hourly rounding in place. Will continue to monitor.

## 2018-08-30 NOTE — PROGRESS NOTES
Bedside report completed. Pt lying in bed comfortably. A/O x 4. Safety precautions in place. Call light and personal belongings within reach. Pt educated on POC and all questions answered at this time.  Educated patient on calling for assistance when needed. All pt needs are met at this time.  Will continue to monitor. Pt updated and aware of going to have an I&D done tonight.

## 2018-08-30 NOTE — CONSULTS
"8/29/2018    Ramone Sal is a 28 y.o. male who presents with a right long finger flexortenosynovitis and is here for operative management. Patient denies numbness, parasthesias, loss of concousness or other trauma    History reviewed. No pertinent past medical history.    History reviewed. No pertinent surgical history.    Medications  No current facility-administered medications on file prior to encounter.      No current outpatient prescriptions on file prior to encounter.       Allergies  Patient has no known allergies.    ROS  Right hand pain. All other systems were reviewed and found to be negative    History reviewed. No pertinent family history.    Social History     Social History   • Marital status: Single     Spouse name: N/A   • Number of children: N/A   • Years of education: N/A     Social History Main Topics   • Smoking status: Current Every Day Smoker     Types: Cigarettes   • Smokeless tobacco: Never Used   • Alcohol use Yes      Comment: social, 2 beers per week   • Drug use: No   • Sexual activity: Not on file     Other Topics Concern   • Not on file     Social History Narrative   • No narrative on file       Physical Exam  Vitals  Blood pressure 135/73, pulse 80, temperature 37.6 °C (99.6 °F), resp. rate 20, height 1.727 m (5' 8\"), weight 76.2 kg (167 lb 15.9 oz), SpO2 99 %.  General: Well Developed, Well Nourished, no acute distress  HEENT: Normocephalic, atraumatic  Eyes: Anicteric, PERRLA, EOMI  Neck: Supple, nontender, no masses  Lungs: CTA, no wheezes or crackles  Heart: RRR, no murmurs, rubs or gallops  Abdomen: Soft, NT, ND  Pelvis: Stable to AP and Lateral Compression  Skin: Intact, no open wounds  Extremities: Right hand swollen, long finger tense and swollen  Neuro: NVI  Vascular: 2+rad/Uln, Capillary refill <2 seconds    Radiographs:  No orders to display       Laboratory Values  Recent Labs      08/27/18   2215  08/29/18   0020   WBC  10.6  17.5*   RBC  4.77  4.76 "   HEMOGLOBIN  12.8*  12.9*   HEMATOCRIT  40.1*  39.4*   MCV  84.1  82.8   MCH  26.8*  27.1   MCHC  31.9*  32.7*   RDW  41.4  39.1   PLATELETCT  191  190   MPV  12.0  11.9     Recent Labs      08/27/18   2215  08/29/18   0020   SODIUM  137  140   POTASSIUM  3.8  3.8   CHLORIDE  105  108   CO2  23  22   GLUCOSE  87  96   BUN  16  10     Recent Labs      08/29/18   1108   APTT  30.1   INR  1.24*         Impression: Right long finger flexor tenosynovitis  Plan:Operative intervention. Risks and benefits of surgery were discussed which include but are not limited to bleeding, infection, neurovascular damage, malunion, nonunion, instability, limb length discrepancy, DVT, PE, MI, Stroke and death. They understand these risks and wish to proceed.

## 2018-08-30 NOTE — PROGRESS NOTES
Dr. Maldonado paged again. Pt is becoming annoyed asking if the I&D is even going to get done today and states he just wants to eat.

## 2018-08-31 LAB
ALBUMIN SERPL BCP-MCNC: 4.2 G/DL (ref 3.2–4.9)
BASOPHILS # BLD AUTO: 0.4 % (ref 0–1.8)
BASOPHILS # BLD: 0.04 K/UL (ref 0–0.12)
BUN SERPL-MCNC: 9 MG/DL (ref 8–22)
CALCIUM SERPL-MCNC: 9.4 MG/DL (ref 8.5–10.5)
CHLORIDE SERPL-SCNC: 109 MMOL/L (ref 96–112)
CO2 SERPL-SCNC: 23 MMOL/L (ref 20–33)
CREAT SERPL-MCNC: 0.83 MG/DL (ref 0.5–1.4)
EOSINOPHIL # BLD AUTO: 0.05 K/UL (ref 0–0.51)
EOSINOPHIL NFR BLD: 0.6 % (ref 0–6.9)
ERYTHROCYTE [DISTWIDTH] IN BLOOD BY AUTOMATED COUNT: 38.1 FL (ref 35.9–50)
GLUCOSE SERPL-MCNC: 102 MG/DL (ref 65–99)
HCT VFR BLD AUTO: 38 % (ref 42–52)
HGB BLD-MCNC: 12.9 G/DL (ref 14–18)
IMM GRANULOCYTES # BLD AUTO: 0.05 K/UL (ref 0–0.11)
IMM GRANULOCYTES NFR BLD AUTO: 0.6 % (ref 0–0.9)
LYMPHOCYTES # BLD AUTO: 1.76 K/UL (ref 1–4.8)
LYMPHOCYTES NFR BLD: 19.6 % (ref 22–41)
MCH RBC QN AUTO: 27.9 PG (ref 27–33)
MCHC RBC AUTO-ENTMCNC: 33.9 G/DL (ref 33.7–35.3)
MCV RBC AUTO: 82.1 FL (ref 81.4–97.8)
MONOCYTES # BLD AUTO: 0.74 K/UL (ref 0–0.85)
MONOCYTES NFR BLD AUTO: 8.2 % (ref 0–13.4)
NEUTROPHILS # BLD AUTO: 6.33 K/UL (ref 1.82–7.42)
NEUTROPHILS NFR BLD: 70.6 % (ref 44–72)
NRBC # BLD AUTO: 0 K/UL
NRBC BLD-RTO: 0 /100 WBC
PHOSPHATE SERPL-MCNC: 3.4 MG/DL (ref 2.5–4.5)
PLATELET # BLD AUTO: 193 K/UL (ref 164–446)
PMV BLD AUTO: 11.9 FL (ref 9–12.9)
POTASSIUM SERPL-SCNC: 3.9 MMOL/L (ref 3.6–5.5)
RBC # BLD AUTO: 4.63 M/UL (ref 4.7–6.1)
SODIUM SERPL-SCNC: 141 MMOL/L (ref 135–145)
VANCOMYCIN TROUGH SERPL-MCNC: 15.2 UG/ML (ref 10–20)
WBC # BLD AUTO: 9 K/UL (ref 4.8–10.8)

## 2018-08-31 PROCEDURE — 700102 HCHG RX REV CODE 250 W/ 637 OVERRIDE(OP): Performed by: INTERNAL MEDICINE

## 2018-08-31 PROCEDURE — 36415 COLL VENOUS BLD VENIPUNCTURE: CPT

## 2018-08-31 PROCEDURE — 700105 HCHG RX REV CODE 258: Performed by: HOSPITALIST

## 2018-08-31 PROCEDURE — 97535 SELF CARE MNGMENT TRAINING: CPT

## 2018-08-31 PROCEDURE — 770020 HCHG ROOM/CARE - TELE (206)

## 2018-08-31 PROCEDURE — 99233 SBSQ HOSP IP/OBS HIGH 50: CPT | Performed by: HOSPITALIST

## 2018-08-31 PROCEDURE — 80069 RENAL FUNCTION PANEL: CPT

## 2018-08-31 PROCEDURE — 80202 ASSAY OF VANCOMYCIN: CPT

## 2018-08-31 PROCEDURE — 700111 HCHG RX REV CODE 636 W/ 250 OVERRIDE (IP): Performed by: HOSPITALIST

## 2018-08-31 PROCEDURE — 85025 COMPLETE CBC W/AUTO DIFF WBC: CPT

## 2018-08-31 PROCEDURE — A9270 NON-COVERED ITEM OR SERVICE: HCPCS | Performed by: INTERNAL MEDICINE

## 2018-08-31 RX ADMIN — VANCOMYCIN HYDROCHLORIDE 1200 MG: 100 INJECTION, POWDER, LYOPHILIZED, FOR SOLUTION INTRAVENOUS at 08:19

## 2018-08-31 RX ADMIN — OXYCODONE HYDROCHLORIDE 10 MG: 10 TABLET ORAL at 09:35

## 2018-08-31 RX ADMIN — OXYCODONE HYDROCHLORIDE 5 MG: 5 TABLET ORAL at 19:44

## 2018-08-31 RX ADMIN — OXYCODONE HYDROCHLORIDE 5 MG: 5 TABLET ORAL at 14:51

## 2018-08-31 RX ADMIN — VANCOMYCIN HYDROCHLORIDE 1200 MG: 100 INJECTION, POWDER, LYOPHILIZED, FOR SOLUTION INTRAVENOUS at 16:28

## 2018-08-31 ASSESSMENT — COGNITIVE AND FUNCTIONAL STATUS - GENERAL
DAILY ACTIVITIY SCORE: 24
SUGGESTED CMS G CODE MODIFIER DAILY ACTIVITY: CH

## 2018-08-31 ASSESSMENT — PAIN SCALES - GENERAL
PAINLEVEL_OUTOF10: 7
PAINLEVEL_OUTOF10: 7
PAINLEVEL_OUTOF10: 0
PAINLEVEL_OUTOF10: 5
PAINLEVEL_OUTOF10: 0
PAINLEVEL_OUTOF10: 7
PAINLEVEL_OUTOF10: 0
PAINLEVEL_OUTOF10: 0

## 2018-08-31 ASSESSMENT — ENCOUNTER SYMPTOMS
FEVER: 0
WEAKNESS: 0
DIAPHORESIS: 0
RESPIRATORY NEGATIVE: 1
WEIGHT LOSS: 0
CHILLS: 0
GASTROINTESTINAL NEGATIVE: 1
EYES NEGATIVE: 1

## 2018-08-31 NOTE — PROGRESS NOTES
"   Orthopaedic PA Progress Note    Interval changes: Did well ovenight. 28 RHD M, PMHx MRSA, now POD #1 S/P I+D R long finger after moving furniture led to inflammation. Gram stain: mod GPC. Presumed Staph. Last Vanco trough level 7.3    ROS - Patient denies any new issues. No chest pain, dyspnea, or fever.  Pain well controlled.    Blood pressure 120/70, pulse 79, temperature 36.9 °C (98.4 °F), resp. rate 14, height 1.727 m (5' 8\"), weight 76.2 kg (167 lb 15.9 oz), SpO2 97 %.    Patient seen and examined  No acute distress  Breathing non labored  RRR  Surgical dressing is clean, dry, and intact. Patient clearly fires forearm flexors, forearm extensors, and moves all five fingers without issue . Sensation is intact to light touch throughout median, ulnar, and radial nerve distributions. Strong and palpable radial pulses with capillary refill less than 2 seconds. No arm or hand discomfort.    Recent Labs      08/27/18   2215  08/29/18   0020  08/30/18   0814   WBC  10.6  17.5*  13.3*   RBC  4.77  4.76  4.67*   HEMOGLOBIN  12.8*  12.9*  13.0*   HEMATOCRIT  40.1*  39.4*  38.7*   MCV  84.1  82.8  82.9   MCH  26.8*  27.1  27.8   MCHC  31.9*  32.7*  33.6*   RDW  41.4  39.1  38.5   PLATELETCT  191  190  170   MPV  12.0  11.9  12.3       Active Hospital Problems    Diagnosis   • Infectious tenosynovitis [M65.10]     Priority: High   • Leukocytosis [D72.829]   • Tobacco abuse [Z72.0]       Assessment/Plan:  POD#1 S/P I+D dominant RH long finger  Wt bearing status - AT  PT/OT-initiated  Wound care:dressing change tomorrow  Drains - no  Valencia-no  Sutures/Staples out- 10-14 days post operatively  Antibiotics: vanco  DVT Prophylaxis- TEDS/SCDs/Foot pumps.   Lovenox: not indicated  Future Procedures - not anticipated  Case Coordination for Discharge Planning - Disposition pending ID w/u        "

## 2018-08-31 NOTE — PROGRESS NOTES
"Pharmacy Kinetics 28 y.o. male on vancomycin day # 3    2018    Currently on Vancomycin 1200 mg iv q8hr (0000 0800 1600)     Indication for Treatment: tenosynovitis     Pertinent history per medical record: Admitted on 2018 for right hand pain and swelling, diagnosed as tenosynovitis. Patient admitted day prior to presentation for same complaint however left AMA. With pain now radiating down arm, empiric antibiotic therapy escalated empirically to include vancomycin. 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 S/P I&D .      Other antibiotics: none     Allergies: Patient has no known allergies.      List concerns for renal function: none     Pertinent cultures to date:   18: PBCx2: NGTD  18 Rt hand,wnd: GPC    Recent Labs      18   0020  18   0814   WBC  17.5*  13.3*   NEUTSPOLYS  70.00  76.20*     Recent Labs      18   0020  18   0814   BUN  10  8   CREATININE  0.69  0.74   ALBUMIN  4.2  3.8     Recent Labs      18   0814   VANCOTROUGH  7.3*     Intake/Output Summary (Last 24 hours) at 18 0751  Last data filed at 18 0200   Gross per 24 hour   Intake             1190 ml   Output             1100 ml   Net               90 ml      Blood pressure 129/72, pulse 76, temperature 36.3 °C (97.4 °F), resp. rate 16, height 1.727 m (5' 8\"), weight 76.2 kg (167 lb 15.9 oz), SpO2 98 %. Temp (24hrs), Av.7 °C (98 °F), Min:36.3 °C (97.4 °F), Max:36.9 °C (98.4 °F)      A/P        1. Vancomycin dose change:No change  2. Next vancomycin level: 18@1530   3. Goal trough: 12-16 mcg/mL  4. Comments: (Last labs 18)Leukocytosis trending down, afebrile over interval, s/p I&D. Renal indices remain stable. Cultures are in process. Will continue to follow.     Darell Campo, PharmD  BCPS      2018 15:23   Vancomycin Trough 15.2       Level @ goal , continue same.     Darell PaceD BCPS           "

## 2018-08-31 NOTE — PROGRESS NOTES
"   Orthopaedic PA Progress Note    Interval changes: did well overnight. Cx + for S.aureus, await sensitivity    ROS - Patient denies any new issues. No chest pain, dyspnea, or fever.  Pain well controlled.    Blood pressure 136/74, pulse 61, temperature 36.3 °C (97.3 °F), resp. rate 12, height 1.727 m (5' 8\"), weight 76.2 kg (167 lb 15.9 oz), SpO2 98 %.    Patient seen and examined  No acute distress  Breathing non labored  RRR  Incision clean suzanna and well approximated. No erythema or lympangitis. VF/DF/SILT all fingertips with immed cap refill  Dressing replaced    Recent Labs      08/29/18   0020  08/30/18   0814  08/31/18   0857   WBC  17.5*  13.3*  9.0   RBC  4.76  4.67*  4.63*   HEMOGLOBIN  12.9*  13.0*  12.9*   HEMATOCRIT  39.4*  38.7*  38.0*   MCV  82.8  82.9  82.1   MCH  27.1  27.8  27.9   MCHC  32.7*  33.6*  33.9   RDW  39.1  38.5  38.1   PLATELETCT  190  170  193   MPV  11.9  12.3  11.9       Active Hospital Problems    Diagnosis   • Infectious tenosynovitis [M65.10]     Priority: High   • Leukocytosis [D72.829]   • Tobacco abuse [Z72.0]     Assessment/Plan:  POD#2 S/P I+D dominant RH long finger  Wt bearing status - AT  PT/OT-initiated  Wound care:dressing changed, now RNs may change QOD and PRN - vaseline gauze and kerlix  Drains - no  Valencia-no  Sutures to be removed at 7-14 days post operatively  Antibiotics: vanco  DVT Prophylaxis- TEDS/SCDs/Foot pumps.   Lovenox: not indicated  Future Procedures - not anticipated  Case Coordination for Discharge Planning - Disposition pending ID w/u  Follow-Up: needs appointment with Dr. Bunn at [facility] at 7-14 days post-op for re-evaluation and suture removal.        "

## 2018-08-31 NOTE — PROGRESS NOTES
[facility] Hospitalist Progress Note    Date of Service: 2018    Chief Complaint  28 y.o. male admitted 2018 with right middle finger flexor tenosynovitis that extended into the entire hand and arm after he left the hospital AMA but returned subsequently with a more septic picture. HE is stable.     Interval Problem Update  Patient is doing better. He is s/p  Irrigation and debridement, right long finger flexor . Denies fever or chills today. Ready for breakfast as he was NPO all day yesterday for his procedure that occurred around .     : moderate GPC on gram stain from wound. NGTD on blood cultures. Doing better overall. 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 He was counseled this will be unlikely although he should be discharged this weekend.    Consultants/Specialty  Ortho    Disposition  Home        Review of Systems   Constitutional: Negative for chills, diaphoresis, fever, malaise/fatigue and weight loss.   Eyes: Negative.    Respiratory: Negative.    Gastrointestinal: Negative.    Musculoskeletal: Positive for joint pain.   Neurological: Negative for weakness.      Physical Exam  Laboratory/Imaging   Hemodynamics  Temp (24hrs), Av.7 °C (98.1 °F), Min:36.3 °C (97.4 °F), Max:37.1 °C (98.7 °F)   Temperature: 37.1 °C (98.7 °F)  Pulse  Av  Min: 59  Max: 84    Blood Pressure: 139/76      Respiratory      Respiration: 16, Pulse Oximetry: 96 %        RUL Breath Sounds: Clear, RML Breath Sounds: Clear, RLL Breath Sounds: Clear, AMARILIS Breath Sounds: Clear, LLL Breath Sounds: Clear    Fluids    Intake/Output Summary (Last 24 hours) at 18 1214  Last data filed at 18 0200   Gross per 24 hour   Intake             1070 ml   Output                0 ml   Net             1070 ml       Nutrition  Orders Placed This Encounter   Procedures   • Diet Order Regular     Standing Status:   Standing     Number of Occurrences:   1     Order Specific Question:   Diet:      Answer:   Regular [1]     Physical Exam   Constitutional: He is oriented to person, place, and time. He appears well-developed and well-nourished. No distress.   HENT:   Head: Normocephalic and atraumatic.   Eyes: Conjunctivae and EOM are normal.   Neck: Normal range of motion. Neck supple.   Cardiovascular: Normal rate, regular rhythm, normal heart sounds and intact distal pulses.    Pulmonary/Chest: Effort normal and breath sounds normal. No respiratory distress. He has no wheezes. He has no rales. He exhibits no tenderness.   Abdominal: Soft. Bowel sounds are normal. He exhibits no distension. There is no tenderness. There is no rebound and no guarding.   Musculoskeletal: Normal range of motion. He exhibits edema and tenderness.   Arm swelling is better than yesterday following surgical intervention. ROM is improved.    Neurological: He is alert and oriented to person, place, and time. He has normal reflexes.   Skin: Skin is dry. He is not diaphoretic. There is erythema.   Residual swelling to the right hand and wrist   Psychiatric: He has a normal mood and affect. His behavior is normal. Judgment and thought content normal.       Recent Labs      08/29/18   0020  08/30/18   0814  08/31/18   0857   WBC  17.5*  13.3*  9.0   RBC  4.76  4.67*  4.63*   HEMOGLOBIN  12.9*  13.0*  12.9*   HEMATOCRIT  39.4*  38.7*  38.0*   MCV  82.8  82.9  82.1   MCH  27.1  27.8  27.9   MCHC  32.7*  33.6*  33.9   RDW  39.1  38.5  38.1   PLATELETCT  190  170  193   MPV  11.9  12.3  11.9     Recent Labs      08/29/18   0020  08/30/18   0814  08/31/18   0857   SODIUM  140  137  141   POTASSIUM  3.8  3.7  3.9   CHLORIDE  108  107  109   CO2  22  22  23   GLUCOSE  96  91  102*   BUN  10  8  9   CREATININE  0.69  0.74  0.83   CALCIUM  9.4  9.4  9.4     Recent Labs      08/29/18   1108   APTT  30.1   INR  1.24*                  Assessment/Plan     Infectious tenosynovitis- (present on admission)   Assessment & Plan    Patient admitted with  progressive disease and started on broad-spectrum antibiotics with Vanc and Zosyn. He is s/p  Irrigation and debridement, right long finger flexor 8/29. Edema and ROM is improved.    Monitor for vancomycin toxicity  Wound culture grew moderate GPC unclear if in chains or clusters at this time. I suspect Strep. Will likely transition to Augment for 14 days course. For now, continue Vancomycin until speciation.   Appreciate ortho assistant with patient.  Pain control with oxycodone, IV morphine and IV Toradol, monitor respiratory status closely          Leukocytosis- (present on admission)   Assessment & Plan    Resolved. Was likely secondary to current infection.  Monitor CBC and vitals as well as cultures.        Tobacco abuse- (present on admission)   Assessment & Plan    Tobacco cessation counseling reinforced          Quality-Core Measures

## 2018-08-31 NOTE — THERAPY
Occupational Therapy Contact Note    Pt provided with handout on HEP for AROM and theraputty exercises for strengthening. Reinforced education of AROM in pain-free range, specific exercises to do as on handout and when to initiate strengthening exerises. Pt's SO present during tx session. Pt verbalized understanding of HEP and has no further concerns at this time. No further OT needs.     Winter Bee, OTR/L  Pager: [phone]

## 2018-08-31 NOTE — PROGRESS NOTES
Pt resting in bed at this time. Even visible chest rise. No signs of distress. Call light in place.  Bed in low and locked position. Hourly rounding in place. Family at bedside.

## 2018-09-01 LAB
ALBUMIN SERPL BCP-MCNC: 4.2 G/DL (ref 3.2–4.9)
BACTERIA WND AEROBE CULT: ABNORMAL
BACTERIA WND AEROBE CULT: ABNORMAL
BASOPHILS # BLD AUTO: 0.5 % (ref 0–1.8)
BASOPHILS # BLD: 0.04 K/UL (ref 0–0.12)
BUN SERPL-MCNC: 10 MG/DL (ref 8–22)
CALCIUM SERPL-MCNC: 9.6 MG/DL (ref 8.5–10.5)
CHLORIDE SERPL-SCNC: 105 MMOL/L (ref 96–112)
CO2 SERPL-SCNC: 25 MMOL/L (ref 20–33)
CREAT SERPL-MCNC: 0.85 MG/DL (ref 0.5–1.4)
EOSINOPHIL # BLD AUTO: 0.08 K/UL (ref 0–0.51)
EOSINOPHIL NFR BLD: 1 % (ref 0–6.9)
ERYTHROCYTE [DISTWIDTH] IN BLOOD BY AUTOMATED COUNT: 38.9 FL (ref 35.9–50)
GLUCOSE SERPL-MCNC: 95 MG/DL (ref 65–99)
GRAM STN SPEC: ABNORMAL
HCT VFR BLD AUTO: 40.8 % (ref 42–52)
HGB BLD-MCNC: 13.4 G/DL (ref 14–18)
IMM GRANULOCYTES # BLD AUTO: 0.06 K/UL (ref 0–0.11)
IMM GRANULOCYTES NFR BLD AUTO: 0.7 % (ref 0–0.9)
LYMPHOCYTES # BLD AUTO: 1.55 K/UL (ref 1–4.8)
LYMPHOCYTES NFR BLD: 18.7 % (ref 22–41)
MCH RBC QN AUTO: 27.1 PG (ref 27–33)
MCHC RBC AUTO-ENTMCNC: 32.8 G/DL (ref 33.7–35.3)
MCV RBC AUTO: 82.4 FL (ref 81.4–97.8)
MONOCYTES # BLD AUTO: 0.58 K/UL (ref 0–0.85)
MONOCYTES NFR BLD AUTO: 7 % (ref 0–13.4)
NEUTROPHILS # BLD AUTO: 5.99 K/UL (ref 1.82–7.42)
NEUTROPHILS NFR BLD: 72.1 % (ref 44–72)
NRBC # BLD AUTO: 0 K/UL
NRBC BLD-RTO: 0 /100 WBC
PHOSPHATE SERPL-MCNC: 3.6 MG/DL (ref 2.5–4.5)
PLATELET # BLD AUTO: 217 K/UL (ref 164–446)
PMV BLD AUTO: 12.1 FL (ref 9–12.9)
POTASSIUM SERPL-SCNC: 3.7 MMOL/L (ref 3.6–5.5)
RBC # BLD AUTO: 4.95 M/UL (ref 4.7–6.1)
SIGNIFICANT IND 70042: ABNORMAL
SITE SITE: ABNORMAL
SODIUM SERPL-SCNC: 141 MMOL/L (ref 135–145)
SOURCE SOURCE: ABNORMAL
WBC # BLD AUTO: 8.3 K/UL (ref 4.8–10.8)

## 2018-09-01 PROCEDURE — 700102 HCHG RX REV CODE 250 W/ 637 OVERRIDE(OP): Performed by: INTERNAL MEDICINE

## 2018-09-01 PROCEDURE — 85025 COMPLETE CBC W/AUTO DIFF WBC: CPT

## 2018-09-01 PROCEDURE — 99233 SBSQ HOSP IP/OBS HIGH 50: CPT | Performed by: HOSPITALIST

## 2018-09-01 PROCEDURE — 80069 RENAL FUNCTION PANEL: CPT

## 2018-09-01 PROCEDURE — 36415 COLL VENOUS BLD VENIPUNCTURE: CPT

## 2018-09-01 PROCEDURE — 700105 HCHG RX REV CODE 258: Performed by: HOSPITALIST

## 2018-09-01 PROCEDURE — A9270 NON-COVERED ITEM OR SERVICE: HCPCS | Performed by: INTERNAL MEDICINE

## 2018-09-01 PROCEDURE — 700111 HCHG RX REV CODE 636 W/ 250 OVERRIDE (IP): Performed by: HOSPITALIST

## 2018-09-01 PROCEDURE — 770020 HCHG ROOM/CARE - TELE (206)

## 2018-09-01 PROCEDURE — A9270 NON-COVERED ITEM OR SERVICE: HCPCS | Performed by: HOSPITALIST

## 2018-09-01 PROCEDURE — 700102 HCHG RX REV CODE 250 W/ 637 OVERRIDE(OP): Performed by: HOSPITALIST

## 2018-09-01 RX ORDER — SULFAMETHOXAZOLE AND TRIMETHOPRIM 800; 160 MG/1; MG/1
1 TABLET ORAL EVERY 12 HOURS
Status: DISCONTINUED | OUTPATIENT
Start: 2018-09-01 | End: 2018-09-02 | Stop reason: HOSPADM

## 2018-09-01 RX ADMIN — OXYCODONE HYDROCHLORIDE 10 MG: 10 TABLET ORAL at 17:24

## 2018-09-01 RX ADMIN — SULFAMETHOXAZOLE AND TRIMETHOPRIM 1 TABLET: 800; 160 TABLET ORAL at 17:24

## 2018-09-01 RX ADMIN — OXYCODONE HYDROCHLORIDE 10 MG: 10 TABLET ORAL at 20:35

## 2018-09-01 RX ADMIN — VANCOMYCIN HYDROCHLORIDE 1200 MG: 100 INJECTION, POWDER, LYOPHILIZED, FOR SOLUTION INTRAVENOUS at 08:48

## 2018-09-01 RX ADMIN — OXYCODONE HYDROCHLORIDE 5 MG: 5 TABLET ORAL at 13:39

## 2018-09-01 RX ADMIN — VANCOMYCIN HYDROCHLORIDE 1200 MG: 100 INJECTION, POWDER, LYOPHILIZED, FOR SOLUTION INTRAVENOUS at 00:11

## 2018-09-01 RX ADMIN — OXYCODONE HYDROCHLORIDE 5 MG: 5 TABLET ORAL at 00:19

## 2018-09-01 ASSESSMENT — PAIN SCALES - GENERAL
PAINLEVEL_OUTOF10: 0
PAINLEVEL_OUTOF10: 0
PAINLEVEL_OUTOF10: 5
PAINLEVEL_OUTOF10: 5
PAINLEVEL_OUTOF10: 7
PAINLEVEL_OUTOF10: 7
PAINLEVEL_OUTOF10: 0

## 2018-09-01 ASSESSMENT — ENCOUNTER SYMPTOMS
RESPIRATORY NEGATIVE: 1
WEIGHT LOSS: 0
GASTROINTESTINAL NEGATIVE: 1
DIAPHORESIS: 0
WEAKNESS: 0
CHILLS: 0
EYES NEGATIVE: 1
FEVER: 0

## 2018-09-01 ASSESSMENT — PATIENT HEALTH QUESTIONNAIRE - PHQ9
SUM OF ALL RESPONSES TO PHQ9 QUESTIONS 1 AND 2: 0
2. FEELING DOWN, DEPRESSED, IRRITABLE, OR HOPELESS: NOT AT ALL
1. LITTLE INTEREST OR PLEASURE IN DOING THINGS: NOT AT ALL

## 2018-09-01 NOTE — PROGRESS NOTES
"Pharmacy Kinetics 28 y.o. male on vancomycin day # 4   2018    Currently on Vancomycin 1200 mg iv q8hr (0000 0800 1600)     Indication for Treatment: tenosynovitis     Pertinent history per medical record: Admitted on 2018 for right hand pain and swelling, diagnosed as tenosynovitis. Patient admitted day prior to presentation for same complaint however left AMA. With pain now radiating down arm, empiric antibiotic therapy escalated empirically to include vancomycin. 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 S/P I&D .      Other antibiotics: none     Allergies: Patient has no known allergies.      List concerns for renal function: none     Pertinent cultures to date:   18: PBCx2: NGTD  18 Rt hand,wnd: Staphylococcus aureus     Recent Labs      18   0814  18   0857   WBC  13.3*  9.0   NEUTSPOLYS  76.20*  70.60     Recent Labs      18   0814  18   0857   BUN  8  9   CREATININE  0.74  0.83   ALBUMIN  3.8  4.2     Recent Labs      18   0814  18   1523   VANCOTROUGH  7.3*  15.2     Intake/Output Summary (Last 24 hours) at 18 0858  Last data filed at 18 0600   Gross per 24 hour   Intake             1940 ml   Output                0 ml   Net             1940 ml      Blood pressure 134/89, pulse 62, temperature 36.7 °C (98 °F), resp. rate 16, height 1.727 m (5' 8\"), weight 76.2 kg (167 lb 15.9 oz), SpO2 99 %. Temp (24hrs), Av.5 °C (97.7 °F), Min:35.9 °C (96.6 °F), Max:36.9 °C (98.4 °F)    A/P        1. Vancomycin dose change:No change  2. Next vancomycin level: 18@2330  3. Goal trough: 12-16 mcg/mL  4. Comments: Wbc wnl, afebrile over interval, s/p I&D. Renal indices remain stable. Cultures staphylococcus aureus, following C&S. Will continue to follow.     Darell Campo, PharmD  BCPS   "

## 2018-09-01 NOTE — PROGRESS NOTES
Called by monitors. Pt sustaining 170's. Pt is in bathroom, standing in front of sink freshening up. Pt denies s/s.

## 2018-09-01 NOTE — PROGRESS NOTES
[facility] Hospitalist Progress Note    Date of Service: 2018    Chief Complaint  28 y.o. male admitted 2018 with right middle finger flexor tenosynovitis that extended into the entire hand and arm after he left the hospital AMA but returned subsequently with a more septic picture. HE is stable.     Interval Problem Update  Patient is doing better. He is s/p  Irrigation and debridement, right long finger flexor . Denies fever or chills today. Ready for breakfast as he was NPO all day yesterday for his procedure that occurred around .     : moderate GPC on gram stain from wound. NGTD on blood cultures. Doing better overall. 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 He was counseled this will be unlikely although he should be discharged this weekend.    : patient feeling significantly better this morning. Wound culture growing MRSA sensitive to multiple regimen. Denies any acute need today.      Consultants/Specialty  Ortho    Disposition  Home        Review of Systems   Constitutional: Negative for chills, diaphoresis, fever, malaise/fatigue and weight loss.   Eyes: Negative.    Respiratory: Negative.    Gastrointestinal: Negative.    Musculoskeletal: Positive for joint pain.   Neurological: Negative for weakness.      Physical Exam  Laboratory/Imaging   Hemodynamics  Temp (24hrs), Av.6 °C (97.8 °F), Min:35.9 °C (96.6 °F), Max:36.9 °C (98.4 °F)   Temperature: 36.5 °C (97.7 °F)  Pulse  Av.5  Min: 59  Max: 84    Blood Pressure: 139/88      Respiratory      Respiration: 18, Pulse Oximetry: 98 %        RUL Breath Sounds: Clear, RML Breath Sounds: Clear, RLL Breath Sounds: Clear, AMARILIS Breath Sounds: Clear, LLL Breath Sounds: Clear    Fluids    Intake/Output Summary (Last 24 hours) at 18 1337  Last data filed at 18 0600   Gross per 24 hour   Intake             1340 ml   Output                0 ml   Net             1340 ml       Nutrition  Orders Placed This  Encounter   Procedures   • Diet Order Regular     Standing Status:   Standing     Number of Occurrences:   1     Order Specific Question:   Diet:     Answer:   Regular [1]     Physical Exam   Constitutional: He is oriented to person, place, and time. He appears well-developed and well-nourished. No distress.   HENT:   Head: Normocephalic and atraumatic.   Eyes: Conjunctivae and EOM are normal.   Neck: Normal range of motion. Neck supple.   Cardiovascular: Normal rate, regular rhythm, normal heart sounds and intact distal pulses.    Pulmonary/Chest: Effort normal and breath sounds normal. No respiratory distress. He has no wheezes. He has no rales. He exhibits no tenderness.   Abdominal: Soft. Bowel sounds are normal. He exhibits no distension. There is no tenderness. There is no rebound and no guarding.   Musculoskeletal: Normal range of motion. He exhibits edema and tenderness.   Significantly improved. ROM improved. Has dressing through mot of the entire arm however.     Neurological: He is alert and oriented to person, place, and time. He has normal reflexes.   Skin: Skin is dry. He is not diaphoretic. There is erythema.   Residual swelling to the right hand and wrist   Psychiatric: He has a normal mood and affect. His behavior is normal. Judgment and thought content normal.       Recent Labs      08/30/18   0814  08/31/18   0857  09/01/18   0911   WBC  13.3*  9.0  8.3   RBC  4.67*  4.63*  4.95   HEMOGLOBIN  13.0*  12.9*  13.4*   HEMATOCRIT  38.7*  38.0*  40.8*   MCV  82.9  82.1  82.4   MCH  27.8  27.9  27.1   MCHC  33.6*  33.9  32.8*   RDW  38.5  38.1  38.9   PLATELETCT  170  193  217   MPV  12.3  11.9  12.1     Recent Labs      08/30/18   0814  08/31/18   0857  09/01/18   0911   SODIUM  137  141  141   POTASSIUM  3.7  3.9  3.7   CHLORIDE  107  109  105   CO2  22  23  25   GLUCOSE  91  102*  95   BUN  8  9  10   CREATININE  0.74  0.83  0.85   CALCIUM  9.4  9.4  9.6                      Assessment/Plan      Infectious tenosynovitis- (present on admission)   Assessment & Plan    Patient admitted with progressive disease and started on broad-spectrum antibiotics with Vanc and Zosyn. He is s/p  Irrigation and debridement, right long finger flexor 8/29. Edema and ROM is improved.   Wound culture grew moderate GPC speciated to be MRSA this morning sensitive to most antibiotics. I will discontinue his Vancomycin and observe him on Bactrim overnight before discharge in a.m. He will require treatment for 2 weeks total.   Appreciate ortho assistant with patient.  Pain control with oxycodone, IV morphine and IV Toradol, monitor respiratory status closely. Should require less pain management.           Leukocytosis- (present on admission)   Assessment & Plan    Resolved. Was secondary to current infection.          Tobacco abuse- (present on admission)   Assessment & Plan    Tobacco cessation counseling reinforced.           Quality-Core Measures

## 2018-09-01 NOTE — PROGRESS NOTES
"Pt very anxious all day. Pt asked, \"Can I just leave?\". Educated pt on importance of medication compliance. Pt continues to ask to be discharged today.  "

## 2018-09-01 NOTE — PROGRESS NOTES
"   Orthopaedic PA Progress Note    Interval changes:Did well overnight    ROS - Patient denies any new issues. No chest pain, dyspnea, or fever.  Pain well controlled.    Blood pressure 139/88, pulse 75, temperature 36.5 °C (97.7 °F), resp. rate 18, height 1.727 m (5' 8\"), weight 76.2 kg (167 lb 15.9 oz), SpO2 98 %.    Patient seen and examined  No acute distress  Breathing non labored  RRR  Surgical dressing is clean, dry, and intact. Patient clearly fires forearm flexors, forearm extensors, and moves all five fingers without issue . Sensation is intact to light touch throughout median, ulnar, and radial nerve distributions. Strong and palpable radial pulses with capillary refill less than 2 seconds. No arm or hand discomfort.     Recent Labs      08/30/18   0814  08/31/18   0857  09/01/18   0911   WBC  13.3*  9.0  8.3   RBC  4.67*  4.63*  4.95   HEMOGLOBIN  13.0*  12.9*  13.4*   HEMATOCRIT  38.7*  38.0*  40.8*   MCV  82.9  82.1  82.4   MCH  27.8  27.9  27.1   MCHC  33.6*  33.9  32.8*   RDW  38.5  38.1  38.9   PLATELETCT  170  193  217   MPV  12.3  11.9  12.1       Active Hospital Problems    Diagnosis   • Infectious tenosynovitis [M65.10]     Priority: High   • Leukocytosis [D72.829]   • Tobacco abuse [Z72.0]     Assessment/Plan:  POD#3 S/P I+D dominant RH long finger  Wt bearing status - AT  PT/OT-initiated  Wound care:dressing changed, now RNs may change QOD and PRN - vaseline gauze and kerlix  Drains - no  [place]-no  Sutures to be removed at 7-14 days post operatively  Antibiotics: vanco  DVT Prophylaxis- TEDS/SCDs/Foot pumps.   Lovenox: not indicated  Future Procedures - not anticipated  Case Coordination for Discharge Planning - Disposition pending ID w/u  Follow-Up: needs appointment with Dr. Bunn at [facility] at 7-14 days post-op for re-evaluation and suture removal.      "

## 2018-09-02 VITALS
HEART RATE: 61 BPM | DIASTOLIC BLOOD PRESSURE: 82 MMHG | SYSTOLIC BLOOD PRESSURE: 123 MMHG | WEIGHT: 166.23 LBS | RESPIRATION RATE: 16 BRPM | OXYGEN SATURATION: 99 % | TEMPERATURE: 97.4 F | BODY MASS INDEX: 25.19 KG/M2 | HEIGHT: 68 IN

## 2018-09-02 PROBLEM — D72.829 LEUKOCYTOSIS: Status: RESOLVED | Noted: 2018-08-29 | Resolved: 2018-09-02

## 2018-09-02 LAB
ALBUMIN SERPL BCP-MCNC: 4.7 G/DL (ref 3.2–4.9)
BACTERIA SPEC ANAEROBE CULT: NORMAL
BASOPHILS # BLD AUTO: 0.6 % (ref 0–1.8)
BASOPHILS # BLD: 0.06 K/UL (ref 0–0.12)
BUN SERPL-MCNC: 10 MG/DL (ref 8–22)
CALCIUM SERPL-MCNC: 9.8 MG/DL (ref 8.5–10.5)
CHLORIDE SERPL-SCNC: 104 MMOL/L (ref 96–112)
CO2 SERPL-SCNC: 25 MMOL/L (ref 20–33)
CREAT SERPL-MCNC: 1.02 MG/DL (ref 0.5–1.4)
EOSINOPHIL # BLD AUTO: 0.21 K/UL (ref 0–0.51)
EOSINOPHIL NFR BLD: 2 % (ref 0–6.9)
ERYTHROCYTE [DISTWIDTH] IN BLOOD BY AUTOMATED COUNT: 38.6 FL (ref 35.9–50)
GLUCOSE SERPL-MCNC: 96 MG/DL (ref 65–99)
HCT VFR BLD AUTO: 38.2 % (ref 42–52)
HGB BLD-MCNC: 12.9 G/DL (ref 14–18)
IMM GRANULOCYTES # BLD AUTO: 0.11 K/UL (ref 0–0.11)
IMM GRANULOCYTES NFR BLD AUTO: 1.1 % (ref 0–0.9)
LYMPHOCYTES # BLD AUTO: 3.63 K/UL (ref 1–4.8)
LYMPHOCYTES NFR BLD: 34.9 % (ref 22–41)
MCH RBC QN AUTO: 27.9 PG (ref 27–33)
MCHC RBC AUTO-ENTMCNC: 33.8 G/DL (ref 33.7–35.3)
MCV RBC AUTO: 82.7 FL (ref 81.4–97.8)
MONOCYTES # BLD AUTO: 0.91 K/UL (ref 0–0.85)
MONOCYTES NFR BLD AUTO: 8.8 % (ref 0–13.4)
NEUTROPHILS # BLD AUTO: 5.48 K/UL (ref 1.82–7.42)
NEUTROPHILS NFR BLD: 52.6 % (ref 44–72)
NRBC # BLD AUTO: 0 K/UL
NRBC BLD-RTO: 0 /100 WBC
PHOSPHATE SERPL-MCNC: 4.3 MG/DL (ref 2.5–4.5)
PLATELET # BLD AUTO: 212 K/UL (ref 164–446)
PMV BLD AUTO: 12.5 FL (ref 9–12.9)
POTASSIUM SERPL-SCNC: 3.9 MMOL/L (ref 3.6–5.5)
RBC # BLD AUTO: 4.62 M/UL (ref 4.7–6.1)
SIGNIFICANT IND 70042: NORMAL
SITE SITE: NORMAL
SODIUM SERPL-SCNC: 139 MMOL/L (ref 135–145)
SOURCE SOURCE: NORMAL
WBC # BLD AUTO: 10.4 K/UL (ref 4.8–10.8)

## 2018-09-02 PROCEDURE — A9270 NON-COVERED ITEM OR SERVICE: HCPCS | Performed by: INTERNAL MEDICINE

## 2018-09-02 PROCEDURE — A9270 NON-COVERED ITEM OR SERVICE: HCPCS | Performed by: HOSPITALIST

## 2018-09-02 PROCEDURE — 85025 COMPLETE CBC W/AUTO DIFF WBC: CPT

## 2018-09-02 PROCEDURE — 700111 HCHG RX REV CODE 636 W/ 250 OVERRIDE (IP): Performed by: INTERNAL MEDICINE

## 2018-09-02 PROCEDURE — 700102 HCHG RX REV CODE 250 W/ 637 OVERRIDE(OP): Performed by: INTERNAL MEDICINE

## 2018-09-02 PROCEDURE — 36415 COLL VENOUS BLD VENIPUNCTURE: CPT

## 2018-09-02 PROCEDURE — 700102 HCHG RX REV CODE 250 W/ 637 OVERRIDE(OP): Performed by: HOSPITALIST

## 2018-09-02 PROCEDURE — 99238 HOSP IP/OBS DSCHRG MGMT 30/<: CPT | Performed by: HOSPITALIST

## 2018-09-02 PROCEDURE — 80069 RENAL FUNCTION PANEL: CPT

## 2018-09-02 RX ORDER — SULFAMETHOXAZOLE AND TRIMETHOPRIM 800; 160 MG/1; MG/1
1 TABLET ORAL EVERY 12 HOURS
Qty: 26 TAB | Refills: 0 | Status: SHIPPED | OUTPATIENT
Start: 2018-09-02 | End: 2018-09-02

## 2018-09-02 RX ORDER — SULFAMETHOXAZOLE AND TRIMETHOPRIM 800; 160 MG/1; MG/1
1 TABLET ORAL EVERY 12 HOURS
Qty: 26 TAB | Refills: 0 | Status: SHIPPED | OUTPATIENT
Start: 2018-09-02

## 2018-09-02 RX ORDER — ACETAMINOPHEN 325 MG/1
650 TABLET ORAL EVERY 6 HOURS PRN
Qty: 30 TAB | Refills: 0 | COMMUNITY
Start: 2018-09-02

## 2018-09-02 RX ADMIN — OXYCODONE HYDROCHLORIDE 10 MG: 10 TABLET ORAL at 05:02

## 2018-09-02 RX ADMIN — KETOROLAC TROMETHAMINE 30 MG: 30 INJECTION, SOLUTION INTRAMUSCULAR at 09:53

## 2018-09-02 RX ADMIN — SULFAMETHOXAZOLE AND TRIMETHOPRIM 1 TABLET: 800; 160 TABLET ORAL at 05:02

## 2018-09-02 ASSESSMENT — PAIN SCALES - GENERAL
PAINLEVEL_OUTOF10: 5
PAINLEVEL_OUTOF10: 2
PAINLEVEL_OUTOF10: 5

## 2018-09-02 NOTE — PROGRESS NOTES
Pt assessment complete. Pt up and ready to go. Safety precautions in place. Call light and personal belongings within reach. Educated to call for assistance if needed.

## 2018-09-02 NOTE — PROGRESS NOTES
Pt given discharge instructions.  Discussed diet, activity, follow up appointments, symptoms and management, and prescriptions provided.  Packet sent with patient.  IV d/c'd, tele box off, and all questions answered. Patient is A&Ox4, discharging home with girlfriend.  Patient's Rx for Bactrim DS called in to [facility] in [place].

## 2018-09-02 NOTE — PROGRESS NOTES
"Patient seen and examined  Cultures positive for MRSA    Blood pressure (!) 99/49, pulse 65, temperature 36.4 °C (97.6 °F), resp. rate 14, height 1.727 m (5' 8\"), weight 75.4 kg (166 lb 3.6 oz), SpO2 97 %.    Recent Labs      08/31/18   0857  09/01/18   0911  09/02/18   0322   WBC  9.0  8.3  10.4   RBC  4.63*  4.95  4.62*   HEMOGLOBIN  12.9*  13.4*  12.9*   HEMATOCRIT  38.0*  40.8*  38.2*   MCV  82.1  82.4  82.7   MCH  27.9  27.1  27.9   MCHC  33.9  32.8*  33.8   RDW  38.1  38.9  38.6   PLATELETCT  193  217  212   MPV  11.9  12.1  12.5       No acute distress  Dressing clean dry and intact  Neurovascularly intact    POD#3    Plan:  DVT Prophylaxis- TEDS/SCDs  Weight Bearing Status-WBAT  PT/OT  Antibiotics: per ID  Case Coordination          "

## 2018-09-02 NOTE — DISCHARGE SUMMARY
Discharge Summary    CHIEF COMPLAINT ON ADMISSION  Chief Complaint   Patient presents with   • Digit Pain     Cornelius fell on finger when he was helping his friend move       Reason for Admission  Anxious      Admission Date  8/28/2018    CODE STATUS  Full Code    HPI & HOSPITAL COURSE  This is a 28 y.o. male with no significant medical history admitted with infected flexor tenosynovitis of the right long finger flexor. He initially left the facility AMA and returned with worsening disease and sepsis later that night and underwent  Irrigation and debridement, right long finger flexor 8/29. Wound cultures grew MRSA; blood cultures were negative. Per ID recommendation, he was started on Bactrim the norberto of his discharge and did well overnight. Ortho provided wound specific discharge instructions. Patient to f/u with ortho within 7-14 days (Dr. Bunn) for re-evaluation and suture removal. He was discharged in stable condition. When to return to work per ortho in discharge instruction.  Tylenol and Ibuprofen prn for pain.    Of note, he was prescribed 26 tabs of Bactrim DS for 13 days as he has received a day here as inpatient plus Van for several days prior. A prescription was printed but also sent to [facility] (pharmacy on file for him).     Physical Exam   Constitutional: He is oriented to person, place, and time. He appears well-developed and well-nourished. No distress.   HENT:   Head: Normocephalic and atraumatic.   Eyes: Conjunctivae and EOM are normal.   Neck: Normal range of motion. Neck supple.   Cardiovascular: Normal rate, regular rhythm, normal heart sounds and intact distal pulses.    Pulmonary/Chest: Effort normal and breath sounds normal. No respiratory distress. He has no wheezes. He has no rales. He exhibits no tenderness.   Abdominal: Soft. Bowel sounds are normal. He exhibits no distension. There is no tenderness. There is no rebound and no guarding.   Musculoskeletal: Normal range of motion. Edema  significantly improved in his righ arm. Has dressing in place.   Significantly improved. ROM improved. Has dressing in place with fauze.   Neurological: He is alert and oriented to person, place, and time. He has normal reflexes.   Skin: Skin is dry. He is not diaphoretic. There is erythema.   Residual swelling to the right hand and wrist - minimal as compared to admission.  Psychiatric: He has a normal mood and affect. His behavior is normal. Judgment and thought content normal.   Therefore, he is discharged in good and stable condition to home with close outpatient follow-up.    The patient met 2-midnight criteria for an inpatient stay at the time of discharge.    Discharge Date  9/2/2018    FOLLOW UP ITEMS POST DISCHARGE  Follow up with your Primary Care provider within 7 days of discharge  Follow up with Dr. Bunn at [facility] at 7-14 days post-op for re-evaluation and suture removal.      DISCHARGE DIAGNOSES  Active Problems:    Infectious tenosynovitis POA: Yes    Tobacco abuse POA: Yes  Resolved Problems:    Leukocytosis POA: Yes      FOLLOW UP  No future appointments.  Carlin Barboza D.O.  [address] [facility] [address]  [phone]    Schedule an appointment as soon as possible for a visit  in 7-14 days, for suture removal      MEDICATIONS ON DISCHARGE     Medication List      START taking these medications      Instructions   acetaminophen 325 MG Tabs  Commonly known as:  TYLENOL   Take 2 Tabs by mouth every 6 hours as needed (Mild Pain; (Pain scale 1-3); Temp greater than 100.5 F).  Dose:  650 mg     sulfamethoxazole-trimethoprim 800-160 MG tablet  Commonly known as:  BACTRIM DS   Take 1 Tab by mouth every 12 hours.  Dose:  1 Tab        CONTINUE taking these medications      Instructions   ibuprofen 200 MG Tabs  Commonly known as:  MOTRIN   Take 400 mg by mouth every 8 hours as needed (Pain).  Dose:  400 mg            Allergies  No Known Allergies    DIET  Orders Placed This  Encounter   Procedures   • Diet Order Regular     Standing Status:   Standing     Number of Occurrences:   1     Order Specific Question:   Diet:     Answer:   Regular [1]       ACTIVITY  As tolerated.  Weight bearing as tolerated    CONSULTATIONS  Orthopedic surgery, Dr. Bunn    PROCEDURES   Irrigation and debridement, right long finger flexor 8/29    LABORATORY  Lab Results   Component Value Date    SODIUM 139 09/02/2018    POTASSIUM 3.9 09/02/2018    CHLORIDE 104 09/02/2018    CO2 25 09/02/2018    GLUCOSE 96 09/02/2018    BUN 10 09/02/2018    CREATININE 1.02 09/02/2018        Lab Results   Component Value Date    WBC 10.4 09/02/2018    HEMOGLOBIN 12.9 (L) 09/02/2018    HEMATOCRIT 38.2 (L) 09/02/2018    PLATELETCT 212 09/02/2018        Total time of the discharge process exceeds 29 minutes.

## 2018-09-02 NOTE — PROGRESS NOTES
Handoff report received. Assumed patient care.  AAO4.   Safety precautions in place. Call light and personal belongings within reach. Educated to call for assistance if needed.

## 2018-09-02 NOTE — PROGRESS NOTES
REC'D BEDSIDE REPORT FROM SEBASTIEN RN*. PT AOX4*. PT DENIES PAIN, SHORTNESS OF BREATH. PLAN OF CARE AND FALL PRECAUTIONS REVIEWED WITH PT. PT DID VERBALIZE UNDERSTANDING, BED ALARM ENGAGED, CALL AGUILERA LEFT IN REACH

## 2018-09-02 NOTE — DISCHARGE INSTRUCTIONS
Discharge Instructions    Discharged to home by car with relative. Discharged via wheelchair, hospital escort: Yes.  Special equipment needed: Not Applicable    Be sure to schedule a follow-up appointment with your primary care doctor or any specialists as instructed.     Discharge Plan:   Smoking Cessation Offered: Patient Refused  Pneumococcal Vaccine Administered/Refused: Not given - Patient refused pneumococcal vaccine  Influenza Vaccine Indication: Patient Refuses    I understand that a diet low in cholesterol, fat, and sodium is recommended for good health. Unless I have been given specific instructions below for another diet, I accept this instruction as my diet prescription.   Other diet: Regular diet    Special Instructions:   Per Dr. Maldonado:  Patient to remain off of work until follow up appointment within 7 to 10 days.  No heavy lifting with the right upper extremity.  Keep area clean and dry.  Further instructions to be given at follow up appointment with Dr. Maldonado.      · Is patient discharged on Warfarin / Coumadin?   No           Infectious Finger Tenosynovitis  Introduction  Tendons are strong bands of tissue that connect muscles to bones. Infectious finger tenosynovitis is an infection of a tendon in a finger and of the layer of tissue that surrounds the tendon (sheath). This condition can quickly damage the tendon and the sheath. The infection can spread to the wrist, arm, and blood.  What are the causes?  This condition is caused by bacteria that get into the tendon and the sheath through a puncture wound. Puncture wounds are commonly caused by a bite or a sharp object, such as a nail.  What increases the risk?  This condition is more likely to be severe in:  · People who have diabetes.  · People who use illegal IV drugs.  · People who have any condition that decreases blood supply to the hand.  What are the signs or symptoms?  Symptoms of this condition include:  · A curled finger.  · Pain  in a finger when it is straightened.  · Swelling and tenderness along the length of a finger.  · Finger redness.  · Fever.  How is this diagnosed?  This condition may be diagnosed with a physical exam and tests, such as:  · A culture and sensitivity test. The test is done to determine which type of bacteria is causing the infection. It involves using a needle to remove a sample of pus or fluid from the finger.  · A blood test. This is done to see if the infection is spreading through the blood.  · An X-ray of the finger. This is done to see if there is an object inside the finger.  How is this treated?  This condition may be treated with:  · Antibiotic medicines. Most people get these medicines through an IV tube at a hospital. Once the infection is under control, the medicines may be taken by mouth at home.  · Surgery. A surgery called incision and drainage may be done to flush out (irrigate) the sheath. You may need this surgery if your infection does not get better within 24 hours or if your treatment did not start within 48 hours of your infection. Sometimes an additional procedure is needed to remove dead tissue or an object (foreign body) from the finger.  · A splint. You may have to wear a splint on your finger to prevent pain and movement.  This condition is a medical emergency. Treatment must be started as soon as possible to keep the infection from spreading and causing permanent damage. Treatment is usually started in a hospital.  Follow these instructions at home:  Medicines  · Take over-the-counter and prescription medicines only as told by your health care provider.  · Take your antibiotic medicine as told by your health care provider. Do not stop taking the antibiotic even if you start to feel better.  If you have a splint:  · Wear it as told by your health care provider. Remove it only as told by your health care provider.  · Loosen the splint if your fingers become numb and tingle, or if they turn  cold and blue.  · If your health care provider approves bathing and showering, cover the splint with a watertight plastic bag to protect it from water. Do not let the splint get wet.  · Keep the splint clean and dry.  · Do not put pressure on any part of the splint until it is fully hardened. This may take several hours.  General instructions  · Raise (elevate) the injured area above the level of your heart while you are sitting or lying down.  · Perform range-of-motion exercises only as told by your health care provider.  · Keep all follow-up visits as told by your health care provider. This is important.  Get help right away if:  · Your symptoms return.  · Your symptoms get worse.  This information is not intended to replace advice given to you by your health care provider. Make sure you discuss any questions you have with your health care provider.  Document Released: 03/16/2010 Document Revised: 05/25/2017 Document Reviewed: 01/06/2016  © 2017 [employer]        Incision and Drainage, Care After  Refer to this sheet in the next few weeks. These instructions provide you with information on caring for yourself after your procedure. Your caregiver may also give you more specific instructions. Your treatment has been planned according to current medical practices, but problems sometimes occur. Call your caregiver if you have any problems or questions after your procedure.  HOME CARE INSTRUCTIONS   · If antibiotic medicine is given, take it as directed. Finish it even if you start to feel better.  · Only take over-the-counter or prescription medicines for pain, discomfort, or fever as directed by your caregiver.  · Keep all follow-up appointments as directed by your caregiver.  · Change any bandages (dressings) as directed by your caregiver. Replace old dressings with clean dressings.  · Wash your hands before and after caring for your wound.  You will receive specific instructions for cleansing and caring for your  wound.   SEEK MEDICAL CARE IF:   · You have increased pain, swelling, or redness around the wound.  · You have increased drainage, smell, or bleeding from the wound.  · You have muscle aches, chills, or you feel generally sick.  · You have a fever.  MAKE SURE YOU:   · Understand these instructions.  · Will watch your condition.  · Will get help right away if you are not doing well or get worse.     This information is not intended to replace advice given to you by your health care provider. Make sure you discuss any questions you have with your health care provider.     Document Released: 03/11/2013 Document Revised: 01/08/2016 Document Reviewed: 03/11/2013  [employer] Interactive Patient Education ©2016 [employer]        Depression / Suicide Risk    As you are discharged from this [facility] facility, it is important to learn how to keep safe from harming yourself.    Recognize the warning signs:  · Abrupt changes in personality, positive or negative- including increase in energy   · Giving away possessions  · Change in eating patterns- significant weight changes-  positive or negative  · Change in sleeping patterns- unable to sleep or sleeping all the time   · Unwillingness or inability to communicate  · Depression  · Unusual sadness, discouragement and loneliness  · Talk of wanting to die  · Neglect of personal appearance   · Rebelliousness- reckless behavior  · Withdrawal from people/activities they love  · Confusion- inability to concentrate     If you or a loved one observes any of these behaviors or has concerns about self-harm, here's what you can do:  · Talk about it- your feelings and reasons for harming yourself  · Remove any means that you might use to hurt yourself (examples: pills, rope, extension cords, firearm)  · Get professional help from the community (Mental Health, Substance Abuse, psychological counseling)  · Do not be alone:Call your Safe Contact- someone whom you trust who will be there  for you.  · Call your local CRISIS HOTLINE 155-7508 or 344-701-6392  · Call your local Children's Mobile Crisis Response Team [place] (700) 440-5756 or www.AccessPay  · Call the toll free National Suicide Prevention Hotlines   · National Suicide Prevention Lifeline 417-595-EFTG (6504)  · National [employer] Network 800-SUICIDE (997-9242)

## 2018-09-03 LAB
BACTERIA BLD CULT: NORMAL
BACTERIA BLD CULT: NORMAL
SIGNIFICANT IND 70042: NORMAL
SIGNIFICANT IND 70042: NORMAL
SITE SITE: NORMAL
SITE SITE: NORMAL
SOURCE SOURCE: NORMAL
SOURCE SOURCE: NORMAL

## 2018-10-09 NOTE — ADDENDUM NOTE
Encounter addended by: Leonela Little M.D. on: 10/8/2018  7:18 PM<BR>    Actions taken: Charge Capture section accepted

## (undated) DEVICE — SUTURE GENERAL

## (undated) DEVICE — SUTURE 3-0 ETHILON FS-1 - (36/BX) 30 INCH

## (undated) DEVICE — GLOVE BIOGEL INDICATOR SZ 7SURGICAL PF LTX - (50/BX 4BX/CA)

## (undated) DEVICE — NEPTUNE 4 PORT MANIFOLD - (20/PK)

## (undated) DEVICE — TOURNIQUET CUFF 18 X 3 ONE PORT DISP - STERILE (10/BX)

## (undated) DEVICE — CHLORAPREP 26 ML APPLICATOR - ORANGE TINT(25/CA)

## (undated) DEVICE — TUBING CLEARLINK DUO-VENT - C-FLO (48EA/CA)

## (undated) DEVICE — HEAD HOLDER JUNIOR/ADULT

## (undated) DEVICE — SLEEVE, VASO, THIGH, MED

## (undated) DEVICE — ELECTRODE DUAL RETURN W/ CORD - (50/PK)

## (undated) DEVICE — PROTECTOR ULNA NERVE - (36PR/CA)

## (undated) DEVICE — KIT ANESTHESIA W/CIRCUIT & 3/LT BAG W/FILTER (20EA/CA)

## (undated) DEVICE — GLOVE BIOGEL PI ORTHO SZ 6 1/2 SURGICAL PF LF (40PR/BX)

## (undated) DEVICE — GLOVE BIOGEL SZ 7 SURGICAL PF LTX - (50PR/BX 4BX/CA)

## (undated) DEVICE — SENSOR SPO2 NEO LNCS ADHESIVE (20/BX) SEE USER NOTES

## (undated) DEVICE — CANISTER SUCTION 3000ML MECHANICAL FILTER AUTO SHUTOFF MEDI-VAC NONSTERILE LF DISP  (40EA/CA)

## (undated) DEVICE — GLOVE BIOGEL INDICATOR SZ 8 SURGICAL PF LTX - (50/BX 4BX/CA)

## (undated) DEVICE — SET LEADWIRE 5 LEAD BEDSIDE DISPOSABLE ECG (1SET OF 5/EA)

## (undated) DEVICE — MASK ANESTHESIA ADULT  - (100/CA)

## (undated) DEVICE — GOWN WARMING STANDARD FLEX - (30/CA)

## (undated) DEVICE — SODIUM CHL IRRIGATION 0.9% 1000ML (12EA/CA)

## (undated) DEVICE — MASK, LARYNGEAL AIRWAY #4

## (undated) DEVICE — BLADE SURGICAL #15 - (50/BX 3BX/CA)

## (undated) DEVICE — SET EXTENSION WITH 2 PORTS (48EA/CA) ***PART #2C8610 IS A SUBSTITUTE*****

## (undated) DEVICE — GLOVE BIOGEL SZ 7.5 SURGICAL PF LTX - (50PR/BX 4BX/CA)

## (undated) DEVICE — STOCKINET TUBULAR 6IN STERILE - 6 X 48YDS (25/CA)

## (undated) DEVICE — ELECTRODE 850 FOAM ADHESIVE - HYDROGEL RADIOTRNSPRNT (50/PK)

## (undated) DEVICE — LACTATED RINGERS INJ 1000 ML - (14EA/CA 60CA/PF)

## (undated) DEVICE — SPONGE GAUZESTER 4 X 4 4PLY - (128PK/CA)

## (undated) DEVICE — SUCTION INSTRUMENT YANKAUER BULBOUS TIP W/O VENT (50EA/CA)

## (undated) DEVICE — DRESSING XEROFORM 1X8 - (50/BX 4BX/CA)

## (undated) DEVICE — PACK MINOR BASIN - (2EA/CA)

## (undated) DEVICE — BANDAGE STERILE 3 IN X 75 IN (12EA/BX 8BX/CA)

## (undated) DEVICE — GOWN SURGEONS X-LARGE - DISP. (30/CA)

## (undated) DEVICE — GLOVE BIOGEL SZ 8 SURGICAL PF LTX - (50PR/BX 4BX/CA)

## (undated) DEVICE — KIT ROOM DECONTAMINATION

## (undated) DEVICE — DRAPE LARGE 3 QUARTER - (20/CA)

## (undated) DEVICE — DRAPE LOWER EXTREMETY - (6/CA)